# Patient Record
Sex: FEMALE | Race: WHITE | NOT HISPANIC OR LATINO | Employment: OTHER | URBAN - METROPOLITAN AREA
[De-identification: names, ages, dates, MRNs, and addresses within clinical notes are randomized per-mention and may not be internally consistent; named-entity substitution may affect disease eponyms.]

---

## 2017-01-12 ENCOUNTER — TELEPHONE (OUTPATIENT)
Dept: INTERNAL MEDICINE | Facility: CLINIC | Age: 62
End: 2017-01-12

## 2017-01-12 DIAGNOSIS — K12.1 ORAL ULCER: Primary | ICD-10-CM

## 2017-01-12 RX ORDER — ACYCLOVIR 50 MG/G
CREAM TOPICAL EVERY 6 HOURS PRN
Qty: 5 G | Refills: 6 | Status: SHIPPED | OUTPATIENT
Start: 2017-01-12 | End: 2017-05-02

## 2017-01-12 NOTE — TELEPHONE ENCOUNTER
----- Message from Tanja Matamoros sent at 1/12/2017 12:30 PM EST -----  Contact: pt - Dr De La Rosa's pt- RE: new Rx  Pt calling and would like a Rx call to pt's pharmacy for pt's cold sore on lip. Pt would like a Rx - Zovirax cream 5%. Pt informs used to get from dentist. Could you please call Rx to pt's pharmacy. Please advise. Thanks      [facility] [place]  # [phone]    Pt # [phone]

## 2017-05-01 DIAGNOSIS — Z12.31 ENCOUNTER FOR SCREENING MAMMOGRAM FOR BREAST CANCER: Primary | ICD-10-CM

## 2017-05-02 ENCOUNTER — OFFICE VISIT (OUTPATIENT)
Dept: INTERNAL MEDICINE | Facility: CLINIC | Age: 62
End: 2017-05-02

## 2017-05-02 ENCOUNTER — APPOINTMENT (OUTPATIENT)
Dept: WOMENS IMAGING | Facility: HOSPITAL | Age: 62
End: 2017-05-02

## 2017-05-02 VITALS
HEIGHT: 64 IN | WEIGHT: 137.8 LBS | SYSTOLIC BLOOD PRESSURE: 118 MMHG | BODY MASS INDEX: 23.52 KG/M2 | DIASTOLIC BLOOD PRESSURE: 76 MMHG

## 2017-05-02 DIAGNOSIS — Z01.419 ENCOUNTER FOR GYNECOLOGICAL EXAMINATION WITH PAPANICOLAOU SMEAR OF CERVIX: ICD-10-CM

## 2017-05-02 DIAGNOSIS — Z00.00 ANNUAL PHYSICAL EXAM: Primary | ICD-10-CM

## 2017-05-02 DIAGNOSIS — F41.9 ANXIETY: ICD-10-CM

## 2017-05-02 DIAGNOSIS — E78.5 HYPERLIPIDEMIA, UNSPECIFIED HYPERLIPIDEMIA TYPE: ICD-10-CM

## 2017-05-02 DIAGNOSIS — E55.9 VITAMIN D DEFICIENCY: ICD-10-CM

## 2017-05-02 DIAGNOSIS — Z12.31 ENCOUNTER FOR SCREENING MAMMOGRAM FOR BREAST CANCER: ICD-10-CM

## 2017-05-02 DIAGNOSIS — Z12.11 ENCOUNTER FOR SCREENING FECAL OCCULT BLOOD TESTING: ICD-10-CM

## 2017-05-02 DIAGNOSIS — Z78.0 MENOPAUSE: ICD-10-CM

## 2017-05-02 DIAGNOSIS — Z80.0 FAMILY HISTORY OF COLON CANCER: ICD-10-CM

## 2017-05-02 LAB
ALBUMIN SERPL-MCNC: 4.09 G/DL (ref 3.4–4.6)
ALBUMIN/GLOB SERPL: 1.4 G/DL
ALP SERPL-CCNC: 70 U/L (ref 46–116)
ALT SERPL W P-5'-P-CCNC: 43 U/L (ref 14–59)
AMORPH URATE CRY URNS QL MICRO: ABNORMAL /HPF
ANION GAP SERPL CALCULATED.3IONS-SCNC: 10 MMOL/L
AST SERPL-CCNC: 30 U/L (ref 7–37)
BACTERIA UR QL AUTO: ABNORMAL /HPF
BASOPHILS # BLD AUTO: 0.01 10*3/MM3 (ref 0–0.2)
BASOPHILS NFR BLD AUTO: 0.2 % (ref 0–2)
BILIRUB SERPL-MCNC: 0.5 MG/DL (ref 0.2–1)
BILIRUB UR QL STRIP: NEGATIVE
BUN BLD-MCNC: 18 MG/DL (ref 6–22)
BUN/CREAT SERPL: 25.7 (ref 7–25)
CALCIUM SPEC-SCNC: 9.3 MG/DL (ref 8.6–10.5)
CHLORIDE SERPL-SCNC: 103 MMOL/L (ref 95–107)
CHOLEST SERPL-MCNC: 278 MG/DL (ref 0–200)
CLARITY UR: CLEAR
CO2 SERPL-SCNC: 29 MMOL/L (ref 23–32)
COLOR UR: YELLOW
CREAT BLD-MCNC: 0.7 MG/DL (ref 0.55–1.02)
DEPRECATED RDW RBC AUTO: 46.3 FL (ref 37–54)
EOSINOPHIL # BLD AUTO: 0.1 10*3/MM3 (ref 0–0.7)
EOSINOPHIL NFR BLD AUTO: 2 % (ref 0–5)
ERYTHROCYTE [DISTWIDTH] IN BLOOD BY AUTOMATED COUNT: 13.3 % (ref 11.5–15)
GFR SERPL CREATININE-BSD FRML MDRD: 85 ML/MIN/1.73
GLOBULIN UR ELPH-MCNC: 3 GM/DL
GLUCOSE BLD-MCNC: 100 MG/DL (ref 70–100)
GLUCOSE UR STRIP-MCNC: NEGATIVE MG/DL
HCT VFR BLD AUTO: 42.2 % (ref 34.1–44.9)
HDLC SERPL-MCNC: 111 MG/DL (ref 40–81)
HEMOCCULT STL QL IA: NEGATIVE
HGB BLD-MCNC: 13.5 G/DL (ref 11.2–15.7)
HGB UR QL STRIP.AUTO: ABNORMAL
HYALINE CASTS UR QL AUTO: ABNORMAL /LPF
KETONES UR QL STRIP: NEGATIVE
LDLC SERPL CALC-MCNC: 153 MG/DL (ref 0–100)
LDLC/HDLC SERPL: 1.38 {RATIO}
LEUKOCYTE ESTERASE UR QL STRIP.AUTO: NEGATIVE
LYMPHOCYTES # BLD AUTO: 1.56 10*3/MM3 (ref 0.8–7)
LYMPHOCYTES NFR BLD AUTO: 31.8 % (ref 10–60)
MCH RBC QN AUTO: 31 PG (ref 26–34)
MCHC RBC AUTO-ENTMCNC: 32 G/DL (ref 31–37)
MCV RBC AUTO: 96.8 FL (ref 80–100)
MONOCYTES # BLD AUTO: 0.33 10*3/MM3 (ref 0–1)
MONOCYTES NFR BLD AUTO: 6.7 % (ref 0–13)
MUCOUS THREADS URNS QL MICRO: ABNORMAL /HPF
NEUTROPHILS # BLD AUTO: 2.9 10*3/MM3 (ref 1–11)
NEUTROPHILS NFR BLD AUTO: 59.3 % (ref 30–85)
NITRITE UR QL STRIP: NEGATIVE
PH UR STRIP.AUTO: 7 [PH] (ref 5–8)
PLATELET # BLD AUTO: 309 10*3/MM3 (ref 150–450)
PMV BLD AUTO: 9.9 FL (ref 6–12)
POTASSIUM BLD-SCNC: 4.3 MMOL/L (ref 3.3–5.3)
PROT SERPL-MCNC: 7.1 G/DL (ref 6.3–8.4)
PROT UR QL STRIP: NEGATIVE
RBC # BLD AUTO: 4.36 10*6/MM3 (ref 3.93–5.22)
RBC # UR: ABNORMAL /HPF
REF LAB TEST METHOD: ABNORMAL
SODIUM BLD-SCNC: 142 MMOL/L (ref 136–145)
SP GR UR STRIP: 1.02 (ref 1–1.03)
SQUAMOUS #/AREA URNS HPF: ABNORMAL /HPF
TRIGL SERPL-MCNC: 68 MG/DL (ref 0–150)
TSH SERPL-ACNC: 3.82 MIU/ML (ref 0.27–4.2)
UROBILINOGEN UR QL STRIP: ABNORMAL
VLDLC SERPL-MCNC: 13.6 MG/DL
WBC NRBC COR # BLD: 4.9 10*3/MM3 (ref 5–10)
WBC UR QL AUTO: ABNORMAL /HPF

## 2017-05-02 PROCEDURE — 81001 URINALYSIS AUTO W/SCOPE: CPT | Performed by: INTERNAL MEDICINE

## 2017-05-02 PROCEDURE — 82274 ASSAY TEST FOR BLOOD FECAL: CPT | Performed by: INTERNAL MEDICINE

## 2017-05-02 PROCEDURE — 80061 LIPID PANEL: CPT | Performed by: INTERNAL MEDICINE

## 2017-05-02 PROCEDURE — 85025 COMPLETE CBC W/AUTO DIFF WBC: CPT | Performed by: INTERNAL MEDICINE

## 2017-05-02 PROCEDURE — 99396 PREV VISIT EST AGE 40-64: CPT | Performed by: INTERNAL MEDICINE

## 2017-05-02 PROCEDURE — 80053 COMPREHEN METABOLIC PANEL: CPT | Performed by: INTERNAL MEDICINE

## 2017-05-02 PROCEDURE — 77067 SCR MAMMO BI INCL CAD: CPT | Performed by: INTERNAL MEDICINE

## 2017-05-02 PROCEDURE — 77067 SCR MAMMO BI INCL CAD: CPT | Performed by: RADIOLOGY

## 2017-05-02 RX ORDER — DESVENLAFAXINE 100 MG/1
1 TABLET, EXTENDED RELEASE ORAL DAILY
Refills: 1 | COMMUNITY
Start: 2017-04-17 | End: 2023-01-22 | Stop reason: SDUPTHER

## 2017-05-05 LAB
CHROM ANALY OVERALL INTERP-IMP: NORMAL
CONV .: NORMAL
CONV PERFORMED BY:: NORMAL
DX ICD CODE: NORMAL
HIV 1 & 2 AB SER-IMP: NORMAL
HPV I/H RISK 1 DNA CVX QL PROBE+SIG AMP: NEGATIVE
REF LAB TEST METHOD: NORMAL
STAT OF ADQ CVX/VAG CYTO-IMP: NORMAL

## 2017-06-26 ENCOUNTER — CLINICAL SUPPORT (OUTPATIENT)
Dept: INTERNAL MEDICINE | Facility: CLINIC | Age: 62
End: 2017-06-26

## 2017-06-26 DIAGNOSIS — Z78.0 MENOPAUSE: ICD-10-CM

## 2017-06-26 PROCEDURE — 77080 DXA BONE DENSITY AXIAL: CPT | Performed by: INTERNAL MEDICINE

## 2017-11-06 ENCOUNTER — OFFICE VISIT (OUTPATIENT)
Dept: INTERNAL MEDICINE | Facility: CLINIC | Age: 62
End: 2017-11-06

## 2017-11-06 VITALS
WEIGHT: 138.8 LBS | HEIGHT: 64 IN | SYSTOLIC BLOOD PRESSURE: 118 MMHG | BODY MASS INDEX: 23.7 KG/M2 | DIASTOLIC BLOOD PRESSURE: 74 MMHG

## 2017-11-06 DIAGNOSIS — Z23 NEED FOR VACCINATION: ICD-10-CM

## 2017-11-06 DIAGNOSIS — F41.9 ANXIETY: ICD-10-CM

## 2017-11-06 DIAGNOSIS — E78.5 HYPERLIPIDEMIA, UNSPECIFIED HYPERLIPIDEMIA TYPE: Primary | ICD-10-CM

## 2017-11-06 DIAGNOSIS — E55.9 VITAMIN D DEFICIENCY: ICD-10-CM

## 2017-11-06 DIAGNOSIS — Z80.0 FAMILY HISTORY OF COLON CANCER: ICD-10-CM

## 2017-11-06 PROCEDURE — 90656 IIV3 VACC NO PRSV 0.5 ML IM: CPT | Performed by: INTERNAL MEDICINE

## 2017-11-06 PROCEDURE — 99213 OFFICE O/P EST LOW 20 MIN: CPT | Performed by: INTERNAL MEDICINE

## 2017-11-06 PROCEDURE — 90471 IMMUNIZATION ADMIN: CPT | Performed by: INTERNAL MEDICINE

## 2017-11-06 NOTE — PROGRESS NOTES
"Izzy Wilde is a 62 y.o. female here for   Chief Complaint   Patient presents with   • Anxiety     6 month follow-up   • Hyperlipidemia   .    Vitals:    11/06/17 1005   BP: 118/74   BP Location: Left arm   Patient Position: Sitting   Cuff Size: Adult   Weight: 138 lb 12.8 oz (63 kg)   Height: 63.5\" (161.3 cm)       Body mass index is 24.2 kg/(m^2).    Anxiety   Presents for follow-up visit. Symptoms include nervous/anxious behavior. Patient reports no chest pain, palpitations or shortness of breath.       Hyperlipidemia   This is a chronic problem. The current episode started more than 1 year ago. The problem is controlled. Recent lipid tests were reviewed and are normal. She has no history of chronic renal disease, diabetes or liver disease. Pertinent negatives include no chest pain or shortness of breath.        The following portions of the patient's history were reviewed and updated as appropriate: allergies, current medications, past social history and problem list.    Review of Systems   Constitutional: Negative for chills, fatigue and fever.   Respiratory: Negative for cough, shortness of breath and wheezing.    Cardiovascular: Negative for chest pain, palpitations and leg swelling.   Psychiatric/Behavioral: Negative for dysphoric mood and sleep disturbance. The patient is nervous/anxious.        Objective   Physical Exam   Constitutional: She appears well-developed and well-nourished. No distress.   Cardiovascular: Normal rate, regular rhythm and normal heart sounds.    Pulmonary/Chest: No respiratory distress. She has no wheezes. She has no rales. She exhibits no tenderness.   Musculoskeletal: She exhibits no edema.   Psychiatric: She has a normal mood and affect. Her behavior is normal.   Nursing note and vitals reviewed.      Assessment/Plan   Diagnoses and all orders for this visit:    Hyperlipidemia, unspecified hyperlipidemia type  Comments:  high LDL, but very high HDL - need healthy " diet & exercise    Vitamin D deficiency  Comments:  D=35 - need daily vit D    Family history of colon cancer  Comments:  last c-scope 2/2013 - need rechk  Orders:  -     Ambulatory Referral to General Surgery    Anxiety  Comments:  controlled with meds per psychiatrist    Need for vaccination  -     Flu Vaccine Quad PF >18YR        Last CPE 5/2/2017.

## 2018-05-21 DIAGNOSIS — Z12.31 ENCOUNTER FOR SCREENING MAMMOGRAM FOR BREAST CANCER: Primary | ICD-10-CM

## 2018-05-22 ENCOUNTER — APPOINTMENT (OUTPATIENT)
Dept: WOMENS IMAGING | Facility: HOSPITAL | Age: 63
End: 2018-05-22

## 2018-05-22 ENCOUNTER — OFFICE VISIT (OUTPATIENT)
Dept: INTERNAL MEDICINE | Facility: CLINIC | Age: 63
End: 2018-05-22

## 2018-05-22 VITALS
WEIGHT: 138 LBS | HEIGHT: 64 IN | SYSTOLIC BLOOD PRESSURE: 116 MMHG | BODY MASS INDEX: 23.56 KG/M2 | HEART RATE: 58 BPM | OXYGEN SATURATION: 98 % | DIASTOLIC BLOOD PRESSURE: 78 MMHG

## 2018-05-22 DIAGNOSIS — Z80.0 FAMILY HISTORY OF COLON CANCER: ICD-10-CM

## 2018-05-22 DIAGNOSIS — E55.9 VITAMIN D DEFICIENCY: ICD-10-CM

## 2018-05-22 DIAGNOSIS — E78.5 HYPERLIPIDEMIA, UNSPECIFIED HYPERLIPIDEMIA TYPE: Primary | ICD-10-CM

## 2018-05-22 DIAGNOSIS — F41.9 ANXIETY: ICD-10-CM

## 2018-05-22 DIAGNOSIS — Z12.31 ENCOUNTER FOR SCREENING MAMMOGRAM FOR BREAST CANCER: ICD-10-CM

## 2018-05-22 DIAGNOSIS — R53.82 CHRONIC FATIGUE: ICD-10-CM

## 2018-05-22 LAB
ALBUMIN SERPL-MCNC: 4.7 G/DL (ref 3.5–5.2)
ALBUMIN/GLOB SERPL: 1.7 G/DL
ALP SERPL-CCNC: 68 U/L (ref 39–117)
ALT SERPL W P-5'-P-CCNC: 16 U/L (ref 1–33)
ANION GAP SERPL CALCULATED.3IONS-SCNC: 13.4 MMOL/L
AST SERPL-CCNC: 18 U/L (ref 1–32)
BACTERIA UR QL AUTO: ABNORMAL /HPF
BASOPHILS # BLD AUTO: 0.01 10*3/MM3 (ref 0–0.2)
BASOPHILS NFR BLD AUTO: 0.2 % (ref 0–2)
BILIRUB SERPL-MCNC: 0.3 MG/DL (ref 0.1–1.2)
BILIRUB UR QL STRIP: NEGATIVE
BUN BLD-MCNC: 20 MG/DL (ref 8–23)
BUN/CREAT SERPL: 26 (ref 7–25)
CALCIUM SPEC-SCNC: 9.9 MG/DL (ref 8.6–10.5)
CHLORIDE SERPL-SCNC: 103 MMOL/L (ref 98–107)
CHOLEST SERPL-MCNC: 254 MG/DL (ref 0–200)
CLARITY UR: CLEAR
CO2 SERPL-SCNC: 27.6 MMOL/L (ref 22–29)
COLOR UR: YELLOW
CREAT BLD-MCNC: 0.77 MG/DL (ref 0.57–1)
DEPRECATED RDW RBC AUTO: 45.5 FL (ref 37–54)
EOSINOPHIL # BLD AUTO: 0.05 10*3/MM3 (ref 0–0.7)
EOSINOPHIL NFR BLD AUTO: 1.1 % (ref 0–5)
ERYTHROCYTE [DISTWIDTH] IN BLOOD BY AUTOMATED COUNT: 13.1 % (ref 11.5–15)
GFR SERPL CREATININE-BSD FRML MDRD: 76 ML/MIN/1.73
GLOBULIN UR ELPH-MCNC: 2.7 GM/DL
GLUCOSE BLD-MCNC: 93 MG/DL (ref 65–99)
GLUCOSE UR STRIP-MCNC: NEGATIVE MG/DL
HCT VFR BLD AUTO: 42.3 % (ref 34.1–44.9)
HDLC SERPL-MCNC: 88 MG/DL (ref 40–60)
HGB BLD-MCNC: 13.5 G/DL (ref 11.2–15.7)
HGB UR QL STRIP.AUTO: ABNORMAL
HYALINE CASTS UR QL AUTO: ABNORMAL /LPF
KETONES UR QL STRIP: NEGATIVE
LDLC SERPL CALC-MCNC: 138 MG/DL (ref 0–100)
LDLC/HDLC SERPL: 1.57 {RATIO}
LEUKOCYTE ESTERASE UR QL STRIP.AUTO: NEGATIVE
LYMPHOCYTES # BLD AUTO: 1.55 10*3/MM3 (ref 0.8–7)
LYMPHOCYTES NFR BLD AUTO: 33.1 % (ref 10–60)
MCH RBC QN AUTO: 30.7 PG (ref 26–34)
MCHC RBC AUTO-ENTMCNC: 31.9 G/DL (ref 31–37)
MCV RBC AUTO: 96.1 FL (ref 80–100)
MONOCYTES # BLD AUTO: 0.35 10*3/MM3 (ref 0–1)
MONOCYTES NFR BLD AUTO: 7.5 % (ref 0–13)
MUCOUS THREADS URNS QL MICRO: ABNORMAL /HPF
NEUTROPHILS # BLD AUTO: 2.72 10*3/MM3 (ref 1–11)
NEUTROPHILS NFR BLD AUTO: 58.1 % (ref 30–85)
NITRITE UR QL STRIP: NEGATIVE
PH UR STRIP.AUTO: 6.5 [PH] (ref 5–8)
PLATELET # BLD AUTO: 275 10*3/MM3 (ref 150–450)
PMV BLD AUTO: 10.5 FL (ref 6–12)
POTASSIUM BLD-SCNC: 4.1 MMOL/L (ref 3.5–5.2)
PROT SERPL-MCNC: 7.4 G/DL (ref 6–8.5)
PROT UR QL STRIP: NEGATIVE
RBC # BLD AUTO: 4.4 10*6/MM3 (ref 3.93–5.22)
RBC # UR: ABNORMAL /HPF
REF LAB TEST METHOD: ABNORMAL
SODIUM BLD-SCNC: 144 MMOL/L (ref 136–145)
SP GR UR STRIP: 1.02 (ref 1–1.03)
SQUAMOUS #/AREA URNS HPF: ABNORMAL /HPF
TRIGL SERPL-MCNC: 141 MG/DL (ref 0–150)
TSH SERPL-ACNC: 3.16 MIU/ML (ref 0.27–4.2)
UROBILINOGEN UR QL STRIP: ABNORMAL
VLDLC SERPL-MCNC: 28.2 MG/DL (ref 5–40)
WBC NRBC COR # BLD: 4.68 10*3/MM3 (ref 5–10)
WBC UR QL AUTO: ABNORMAL /HPF

## 2018-05-22 PROCEDURE — 85025 COMPLETE CBC W/AUTO DIFF WBC: CPT | Performed by: INTERNAL MEDICINE

## 2018-05-22 PROCEDURE — 81001 URINALYSIS AUTO W/SCOPE: CPT | Performed by: INTERNAL MEDICINE

## 2018-05-22 PROCEDURE — 77067 SCR MAMMO BI INCL CAD: CPT | Performed by: INTERNAL MEDICINE

## 2018-05-22 PROCEDURE — 77067 SCR MAMMO BI INCL CAD: CPT | Performed by: RADIOLOGY

## 2018-05-22 PROCEDURE — 80061 LIPID PANEL: CPT | Performed by: INTERNAL MEDICINE

## 2018-05-22 PROCEDURE — 99213 OFFICE O/P EST LOW 20 MIN: CPT | Performed by: INTERNAL MEDICINE

## 2018-05-22 PROCEDURE — 80053 COMPREHEN METABOLIC PANEL: CPT | Performed by: INTERNAL MEDICINE

## 2018-05-22 NOTE — PROGRESS NOTES
"Izzy Wilde is a 63 y.o. female here for   Chief Complaint   Patient presents with   • Hyperlipidemia   • Anxiety   .    Vitals:    05/22/18 1047   BP: 116/78   BP Location: Left arm   Patient Position: Sitting   Cuff Size: Adult   Pulse: 58   SpO2: 98%   Weight: 62.6 kg (138 lb)   Height: 161.3 cm (63.5\")       Body mass index is 24.06 kg/m².    Hyperlipidemia   This is a chronic problem. The current episode started more than 1 year ago. The problem is controlled. Recent lipid tests were reviewed and are normal. She has no history of diabetes. Pertinent negatives include no chest pain or shortness of breath.   Anxiety   Presents for follow-up visit. Patient reports no chest pain, nervous/anxious behavior, palpitations or shortness of breath. Symptoms occur occasionally. The severity of symptoms is mild.            The following portions of the patient's history were reviewed and updated as appropriate: allergies, current medications, past social history and problem list.    Review of Systems   Constitutional: Negative for chills, fatigue and fever.   Respiratory: Negative for cough, shortness of breath and wheezing.    Cardiovascular: Negative for chest pain, palpitations and leg swelling.   Psychiatric/Behavioral: Negative for dysphoric mood and sleep disturbance. The patient is not nervous/anxious.        Objective   Physical Exam   Constitutional: She appears well-developed and well-nourished. No distress.   Cardiovascular: Normal rate, regular rhythm and normal heart sounds.    Pulmonary/Chest: No respiratory distress. She has no wheezes. She has no rales. She exhibits no tenderness.   Musculoskeletal: She exhibits no edema.   Psychiatric: She has a normal mood and affect. Her behavior is normal.   Nursing note and vitals reviewed.      Assessment/Plan   Diagnoses and all orders for this visit:    Hyperlipidemia, unspecified hyperlipidemia type  Comments:  need diet/ex  Orders:  -     Comprehensive " Metabolic Panel; Future  -     Lipid Panel; Future    Vitamin D deficiency  Comments:  continue 1,000 units daily    Family history of colon cancer  Comments:  c-scope 4/2018    Anxiety  Comments:  f/u with dr benz (psych)    Chronic fatigue  Comments:  mild  Orders:  -     CBC Auto Differential; Future  -     Urinalysis With / Microscopic If Indicated - Urine, Clean Catch; Future  -     TSH Rfx On Abnormal To Free T4; Future

## 2018-05-24 ENCOUNTER — DOCUMENTATION (OUTPATIENT)
Dept: INTERNAL MEDICINE | Facility: CLINIC | Age: 63
End: 2018-05-24

## 2018-05-24 NOTE — PROGRESS NOTES
Message left for pt to return call regarding recent screening mammogram results.  FABY Sterling(R)(M),ARRT  5-24-18  [phone]

## 2018-05-25 ENCOUNTER — DOCUMENTATION (OUTPATIENT)
Dept: INTERNAL MEDICINE | Facility: CLINIC | Age: 63
End: 2018-05-25

## 2018-05-25 DIAGNOSIS — R92.1 BREAST CALCIFICATIONS: Primary | ICD-10-CM

## 2018-05-25 NOTE — PROGRESS NOTES
Diagnostic Breast Testing scheduled at [facility] 5-31-18 at 1PM. Pt informed and Screening results discussed.     FABY Sterling(R)(M),ARRT  5-25-18 [phone]

## 2018-05-31 ENCOUNTER — APPOINTMENT (OUTPATIENT)
Dept: WOMENS IMAGING | Facility: HOSPITAL | Age: 63
End: 2018-05-31

## 2018-05-31 PROCEDURE — 77065 DX MAMMO INCL CAD UNI: CPT | Performed by: RADIOLOGY

## 2018-05-31 PROCEDURE — MDREVIEWSP: Performed by: RADIOLOGY

## 2018-12-03 ENCOUNTER — TELEPHONE (OUTPATIENT)
Dept: INTERNAL MEDICINE | Facility: CLINIC | Age: 63
End: 2018-12-03

## 2018-12-03 DIAGNOSIS — R92.1 BREAST CALCIFICATIONS: Primary | ICD-10-CM

## 2018-12-03 NOTE — TELEPHONE ENCOUNTER
----- Message from Cristina De La Rosa MD sent at 12/3/2018  4:06 PM EST -----  Regarding: RE: order needed for pt appt 12/4/18  pls order  ----- Message -----  From: Madelin Ortega RegSched Rep  Sent: 12/3/2018   1:52 PM  To: Cristina De La Rosa MD  Subject: order needed for pt appt 12/4/18                 Hi there,     We are seeing this patient at [facility] on Tuesday 12/4/18 at 1:00PM.     She is being seen for:  Left breast diagnostic mammogram  Dx: 6 month follow up for calcifications    Could you please put an order in her chart for us?  If you have any questions, please call me at: [phone].    Thank you,   Sindy Garcia

## 2018-12-04 ENCOUNTER — APPOINTMENT (OUTPATIENT)
Dept: WOMENS IMAGING | Facility: HOSPITAL | Age: 63
End: 2018-12-04

## 2018-12-04 PROCEDURE — 77065 DX MAMMO INCL CAD UNI: CPT | Performed by: RADIOLOGY

## 2019-01-09 DIAGNOSIS — R92.1 BREAST CALCIFICATIONS: ICD-10-CM

## 2019-05-23 DIAGNOSIS — Z12.31 ENCOUNTER FOR SCREENING MAMMOGRAM FOR BREAST CANCER: Primary | ICD-10-CM

## 2019-05-24 ENCOUNTER — OFFICE VISIT (OUTPATIENT)
Dept: INTERNAL MEDICINE | Facility: CLINIC | Age: 64
End: 2019-05-24

## 2019-05-24 ENCOUNTER — APPOINTMENT (OUTPATIENT)
Dept: WOMENS IMAGING | Facility: HOSPITAL | Age: 64
End: 2019-05-24

## 2019-05-24 VITALS
WEIGHT: 134.2 LBS | SYSTOLIC BLOOD PRESSURE: 134 MMHG | DIASTOLIC BLOOD PRESSURE: 72 MMHG | HEIGHT: 64 IN | TEMPERATURE: 98.1 F | HEART RATE: 60 BPM | BODY MASS INDEX: 22.91 KG/M2 | OXYGEN SATURATION: 98 % | RESPIRATION RATE: 15 BRPM

## 2019-05-24 DIAGNOSIS — E55.9 VITAMIN D DEFICIENCY: ICD-10-CM

## 2019-05-24 DIAGNOSIS — Z12.4 CERVICAL CANCER SCREENING: ICD-10-CM

## 2019-05-24 DIAGNOSIS — F41.9 ANXIETY: ICD-10-CM

## 2019-05-24 DIAGNOSIS — Z12.11 COLON CANCER SCREENING: ICD-10-CM

## 2019-05-24 DIAGNOSIS — Z80.0 FAMILY HISTORY OF COLON CANCER: ICD-10-CM

## 2019-05-24 DIAGNOSIS — Z86.010 HISTORY OF ADENOMATOUS POLYP OF COLON: ICD-10-CM

## 2019-05-24 DIAGNOSIS — E78.5 HYPERLIPIDEMIA, UNSPECIFIED HYPERLIPIDEMIA TYPE: ICD-10-CM

## 2019-05-24 DIAGNOSIS — R41.3 MEMORY LOSS: ICD-10-CM

## 2019-05-24 DIAGNOSIS — Z12.31 ENCOUNTER FOR SCREENING MAMMOGRAM FOR BREAST CANCER: ICD-10-CM

## 2019-05-24 DIAGNOSIS — Z00.00 ANNUAL PHYSICAL EXAM: Primary | ICD-10-CM

## 2019-05-24 LAB
ALBUMIN SERPL-MCNC: 4.4 G/DL (ref 3.5–5.2)
ALBUMIN/GLOB SERPL: 1.6 G/DL
ALP SERPL-CCNC: 65 U/L (ref 39–117)
ALT SERPL W P-5'-P-CCNC: 18 U/L (ref 1–33)
ANION GAP SERPL CALCULATED.3IONS-SCNC: 14.5 MMOL/L
AST SERPL-CCNC: 23 U/L (ref 1–32)
BACTERIA UR QL AUTO: ABNORMAL /HPF
BASOPHILS # BLD AUTO: 0.02 10*3/MM3 (ref 0–0.2)
BASOPHILS NFR BLD AUTO: 0.5 % (ref 0–1.5)
BILIRUB SERPL-MCNC: 0.5 MG/DL (ref 0.2–1.2)
BILIRUB UR QL CFM: NEGATIVE
BILIRUB UR QL STRIP: ABNORMAL
BUN BLD-MCNC: 16 MG/DL (ref 8–23)
BUN/CREAT SERPL: 23.2 (ref 7–25)
CALCIUM SPEC-SCNC: 10 MG/DL (ref 8.6–10.5)
CHLORIDE SERPL-SCNC: 100 MMOL/L (ref 98–107)
CHOLEST SERPL-MCNC: 249 MG/DL (ref 0–200)
CLARITY UR: CLEAR
CO2 SERPL-SCNC: 25.5 MMOL/L (ref 22–29)
COLOR UR: ABNORMAL
CREAT BLD-MCNC: 0.69 MG/DL (ref 0.57–1)
DEPRECATED RDW RBC AUTO: 45 FL (ref 37–54)
EOSINOPHIL # BLD AUTO: 0.07 10*3/MM3 (ref 0–0.4)
EOSINOPHIL NFR BLD AUTO: 1.7 % (ref 0.3–6.2)
ERYTHROCYTE [DISTWIDTH] IN BLOOD BY AUTOMATED COUNT: 13 % (ref 12.3–15.4)
FOLATE SERPL-MCNC: 10 NG/ML (ref 4.78–24.2)
GFR SERPL CREATININE-BSD FRML MDRD: 86 ML/MIN/1.73
GLOBULIN UR ELPH-MCNC: 2.8 GM/DL
GLUCOSE BLD-MCNC: 96 MG/DL (ref 65–99)
GLUCOSE UR STRIP-MCNC: NEGATIVE MG/DL
HCT VFR BLD AUTO: 42 % (ref 34–46.6)
HDLC SERPL-MCNC: 103 MG/DL (ref 40–60)
HEMOCCULT STL QL IA: NEGATIVE
HGB BLD-MCNC: 13.5 G/DL (ref 12–15.9)
HGB UR QL STRIP.AUTO: ABNORMAL
HYALINE CASTS UR QL AUTO: ABNORMAL /LPF
KETONES UR QL STRIP: ABNORMAL
LDLC SERPL CALC-MCNC: 133 MG/DL (ref 0–100)
LDLC/HDLC SERPL: 1.29 {RATIO}
LEUKOCYTE ESTERASE UR QL STRIP.AUTO: NEGATIVE
LYMPHOCYTES # BLD AUTO: 1.26 10*3/MM3 (ref 0.7–3.1)
LYMPHOCYTES NFR BLD AUTO: 31.2 % (ref 19.6–45.3)
MCH RBC QN AUTO: 31 PG (ref 26.6–33)
MCHC RBC AUTO-ENTMCNC: 32.1 G/DL (ref 31.5–35.7)
MCV RBC AUTO: 96.3 FL (ref 79–97)
MONOCYTES # BLD AUTO: 0.29 10*3/MM3 (ref 0.1–0.9)
MONOCYTES NFR BLD AUTO: 7.2 % (ref 5–12)
MUCOUS THREADS URNS QL MICRO: ABNORMAL /HPF
NEUTROPHILS # BLD AUTO: 2.4 10*3/MM3 (ref 1.7–7)
NEUTROPHILS NFR BLD AUTO: 59.4 % (ref 42.7–76)
NITRITE UR QL STRIP: NEGATIVE
PH UR STRIP.AUTO: 6.5 [PH] (ref 5–8)
PLATELET # BLD AUTO: 316 10*3/MM3 (ref 140–450)
PMV BLD AUTO: 9.9 FL (ref 6–12)
POTASSIUM BLD-SCNC: 3.9 MMOL/L (ref 3.5–5.2)
PROT SERPL-MCNC: 7.2 G/DL (ref 6–8.5)
PROT UR QL STRIP: ABNORMAL
RBC # BLD AUTO: 4.36 10*6/MM3 (ref 3.77–5.28)
RBC # UR: ABNORMAL /HPF
REF LAB TEST METHOD: ABNORMAL
SODIUM BLD-SCNC: 140 MMOL/L (ref 136–145)
SP GR UR STRIP: 1.02 (ref 1–1.03)
SQUAMOUS #/AREA URNS HPF: ABNORMAL /HPF
TRIGL SERPL-MCNC: 65 MG/DL (ref 0–150)
TSH SERPL DL<=0.05 MIU/L-ACNC: 2.86 MIU/ML (ref 0.27–4.2)
UROBILINOGEN UR QL STRIP: ABNORMAL
VIT B12 BLD-MCNC: 1165 PG/ML (ref 211–946)
VLDLC SERPL-MCNC: 13 MG/DL (ref 5–40)
WBC NRBC COR # BLD: 4.04 10*3/MM3 (ref 3.4–10.8)
WBC UR QL AUTO: ABNORMAL /HPF

## 2019-05-24 PROCEDURE — 82746 ASSAY OF FOLIC ACID SERUM: CPT | Performed by: INTERNAL MEDICINE

## 2019-05-24 PROCEDURE — 80061 LIPID PANEL: CPT | Performed by: INTERNAL MEDICINE

## 2019-05-24 PROCEDURE — 84443 ASSAY THYROID STIM HORMONE: CPT | Performed by: INTERNAL MEDICINE

## 2019-05-24 PROCEDURE — 99396 PREV VISIT EST AGE 40-64: CPT | Performed by: INTERNAL MEDICINE

## 2019-05-24 PROCEDURE — 85025 COMPLETE CBC W/AUTO DIFF WBC: CPT | Performed by: INTERNAL MEDICINE

## 2019-05-24 PROCEDURE — 82274 ASSAY TEST FOR BLOOD FECAL: CPT | Performed by: INTERNAL MEDICINE

## 2019-05-24 PROCEDURE — 77067 SCR MAMMO BI INCL CAD: CPT | Performed by: RADIOLOGY

## 2019-05-24 PROCEDURE — 77067 SCR MAMMO BI INCL CAD: CPT | Performed by: INTERNAL MEDICINE

## 2019-05-24 PROCEDURE — 82607 VITAMIN B-12: CPT | Performed by: INTERNAL MEDICINE

## 2019-05-24 PROCEDURE — 80053 COMPREHEN METABOLIC PANEL: CPT | Performed by: INTERNAL MEDICINE

## 2019-05-24 PROCEDURE — 81001 URINALYSIS AUTO W/SCOPE: CPT | Performed by: INTERNAL MEDICINE

## 2019-05-24 NOTE — PATIENT INSTRUCTIONS
Health Maintenance, Female  Adopting a healthy lifestyle and getting preventive care can go a long way to promote health and wellness. Talk with your health care provider about what schedule of regular examinations is right for you. This is a good chance for you to check in with your provider about disease prevention and staying healthy.  In between checkups, there are plenty of things you can do on your own. Experts have done a lot of research about which lifestyle changes and preventive measures are most likely to keep you healthy. Ask your health care provider for more information.  Weight and diet  Eat a healthy diet  · Be sure to include plenty of vegetables, fruits, low-fat dairy products, and lean protein.  · Do not eat a lot of foods high in solid fats, added sugars, or salt.  · Get regular exercise. This is one of the most important things you can do for your health.  ? Most adults should exercise for at least 150 minutes each week. The exercise should increase your heart rate and make you sweat (moderate-intensity exercise).  ? Most adults should also do strengthening exercises at least twice a week. This is in addition to the moderate-intensity exercise.    Maintain a healthy weight  · Body mass index (BMI) is a measurement that can be used to identify possible weight problems. It estimates body fat based on height and weight. Your health care provider can help determine your BMI and help you achieve or maintain a healthy weight.  · For females 20 years of age and older:  ? A BMI below 18.5 is considered underweight.  ? A BMI of 18.5 to 24.9 is normal.  ? A BMI of 25 to 29.9 is considered overweight.  ? A BMI of 30 and above is considered obese.    Watch levels of cholesterol and blood lipids  · You should start having your blood tested for lipids and cholesterol at 20 years of age, then have this test every 5 years.  · You may need to have your cholesterol levels checked more often if:  ? Your lipid or  cholesterol levels are high.  ? You are older than 50 years of age.  ? You are at high risk for heart disease.    Cancer screening  Lung Cancer  · Lung cancer screening is recommended for adults 55-80 years old who are at high risk for lung cancer because of a history of smoking.  · A yearly low-dose CT scan of the lungs is recommended for people who:  ? Currently smoke.  ? Have quit within the past 15 years.  ? Have at least a 30-pack-year history of smoking. A pack year is smoking an average of one pack of cigarettes a day for 1 year.  · Yearly screening should continue until it has been 15 years since you quit.  · Yearly screening should stop if you develop a health problem that would prevent you from having lung cancer treatment.    Breast Cancer  · Practice breast self-awareness. This means understanding how your breasts normally appear and feel.  · It also means doing regular breast self-exams. Let your health care provider know about any changes, no matter how small.  · If you are in your 20s or 30s, you should have a clinical breast exam (CBE) by a health care provider every 1-3 years as part of a regular health exam.  · If you are 40 or older, have a CBE every year. Also consider having a breast X-ray (mammogram) every year.  · If you have a family history of breast cancer, talk to your health care provider about genetic screening.  · If you are at high risk for breast cancer, talk to your health care provider about having an MRI and a mammogram every year.  · Breast cancer gene (BRCA) assessment is recommended for women who have family members with BRCA-related cancers. BRCA-related cancers include:  ? Breast.  ? Ovarian.  ? Tubal.  ? Peritoneal cancers.  · Results of the assessment will determine the need for genetic counseling and BRCA1 and BRCA2 testing.    Cervical Cancer  Your health care provider may recommend that you be screened regularly for cancer of the pelvic organs (ovaries, uterus, and  vagina). This screening involves a pelvic examination, including checking for microscopic changes to the surface of your cervix (Pap test). You may be encouraged to have this screening done every 3 years, beginning at age 21.  · For women ages 30-65, health care providers may recommend pelvic exams and Pap testing every 3 years, or they may recommend the Pap and pelvic exam, combined with testing for human papilloma virus (HPV), every 5 years. Some types of HPV increase your risk of cervical cancer. Testing for HPV may also be done on women of any age with unclear Pap test results.  · Other health care providers may not recommend any screening for nonpregnant women who are considered low risk for pelvic cancer and who do not have symptoms. Ask your health care provider if a screening pelvic exam is right for you.  · If you have had past treatment for cervical cancer or a condition that could lead to cancer, you need Pap tests and screening for cancer for at least 20 years after your treatment. If Pap tests have been discontinued, your risk factors (such as having a new sexual partner) need to be reassessed to determine if screening should resume. Some women have medical problems that increase the chance of getting cervical cancer. In these cases, your health care provider may recommend more frequent screening and Pap tests.    Colorectal Cancer  · This type of cancer can be detected and often prevented.  · Routine colorectal cancer screening usually begins at 50 years of age and continues through 75 years of age.  · Your health care provider may recommend screening at an earlier age if you have risk factors for colon cancer.  · Your health care provider may also recommend using home test kits to check for hidden blood in the stool.  · A small camera at the end of a tube can be used to examine your colon directly (sigmoidoscopy or colonoscopy). This is done to check for the earliest forms of colorectal  cancer.  · Routine screening usually begins at age 50.  · Direct examination of the colon should be repeated every 5-10 years through 75 years of age. However, you may need to be screened more often if early forms of precancerous polyps or small growths are found.    Skin Cancer  · Check your skin from head to toe regularly.  · Tell your health care provider about any new moles or changes in moles, especially if there is a change in a mole's shape or color.  · Also tell your health care provider if you have a mole that is larger than the size of a pencil eraser.  · Always use sunscreen. Apply sunscreen liberally and repeatedly throughout the day.  · Protect yourself by wearing long sleeves, pants, a wide-brimmed hat, and sunglasses whenever you are outside.    Heart disease, diabetes, and high blood pressure  · High blood pressure causes heart disease and increases the risk of stroke. High blood pressure is more likely to develop in:  ? People who have blood pressure in the high end of the normal range (130-139/85-89 mm Hg).  ? People who are overweight or obese.  ? People who are .  · If you are 18-39 years of age, have your blood pressure checked every 3-5 years. If you are 40 years of age or older, have your blood pressure checked every year. You should have your blood pressure measured twice--once when you are at a hospital or clinic, and once when you are not at a hospital or clinic. Record the average of the two measurements. To check your blood pressure when you are not at a hospital or clinic, you can use:  ? An automated blood pressure machine at a pharmacy.  ? A home blood pressure monitor.  · If you are between 55 years and 79 years old, ask your health care provider if you should take aspirin to prevent strokes.  · Have regular diabetes screenings. This involves taking a blood sample to check your fasting blood sugar level.  ? If you are at a normal weight and have a low risk for  diabetes, have this test once every three years after 45 years of age.  ? If you are overweight and have a high risk for diabetes, consider being tested at a younger age or more often.  Preventing infection  Hepatitis B  · If you have a higher risk for hepatitis B, you should be screened for this virus. You are considered at high risk for hepatitis B if:  ? You were born in a country where hepatitis B is common. Ask your health care provider which countries are considered high risk.  ? Your parents were born in a high-risk country, and you have not been immunized against hepatitis B (hepatitis B vaccine).  ? You have HIV or AIDS.  ? You use needles to inject street drugs.  ? You live with someone who has hepatitis B.  ? You have had sex with someone who has hepatitis B.  ? You get hemodialysis treatment.  ? You take certain medicines for conditions, including cancer, organ transplantation, and autoimmune conditions.    Hepatitis C  · Blood testing is recommended for:  ? Everyone born from 1945 through 1965.  ? Anyone with known risk factors for hepatitis C.    Sexually transmitted infections (STIs)  · You should be screened for sexually transmitted infections (STIs) including gonorrhea and chlamydia if:  ? You are sexually active and are younger than 24 years of age.  ? You are older than 24 years of age and your health care provider tells you that you are at risk for this type of infection.  ? Your sexual activity has changed since you were last screened and you are at an increased risk for chlamydia or gonorrhea. Ask your health care provider if you are at risk.  · If you do not have HIV, but are at risk, it may be recommended that you take a prescription medicine daily to prevent HIV infection. This is called pre-exposure prophylaxis (PrEP). You are considered at risk if:  ? You are sexually active and do not regularly use condoms or know the HIV status of your partner(s).  ? You take drugs by injection.  ? You  are sexually active with a partner who has HIV.    Talk with your health care provider about whether you are at high risk of being infected with HIV. If you choose to begin PrEP, you should first be tested for HIV. You should then be tested every 3 months for as long as you are taking PrEP.  Pregnancy  · If you are premenopausal and you may become pregnant, ask your health care provider about preconception counseling.  · If you may become pregnant, take 400 to 800 micrograms (mcg) of folic acid every day.  · If you want to prevent pregnancy, talk to your health care provider about birth control (contraception).  Osteoporosis and menopause  · Osteoporosis is a disease in which the bones lose minerals and strength with aging. This can result in serious bone fractures. Your risk for osteoporosis can be identified using a bone density scan.  · If you are 65 years of age or older, or if you are at risk for osteoporosis and fractures, ask your health care provider if you should be screened.  · Ask your health care provider whether you should take a calcium or vitamin D supplement to lower your risk for osteoporosis.  · Menopause may have certain physical symptoms and risks.  · Hormone replacement therapy may reduce some of these symptoms and risks.  Talk to your health care provider about whether hormone replacement therapy is right for you.  Follow these instructions at home:  · Schedule regular health, dental, and eye exams.  · Stay current with your immunizations.  · Do not use any tobacco products including cigarettes, chewing tobacco, or electronic cigarettes.  · If you are pregnant, do not drink alcohol.  · If you are breastfeeding, limit how much and how often you drink alcohol.  · Limit alcohol intake to no more than 1 drink per day for nonpregnant women. One drink equals 12 ounces of beer, 5 ounces of wine, or 1½ ounces of hard liquor.  · Do not use street drugs.  · Do not share needles.  · Ask your health care  provider for help if you need support or information about quitting drugs.  · Tell your health care provider if you often feel depressed.  · Tell your health care provider if you have ever been abused or do not feel safe at home.  This information is not intended to replace advice given to you by your health care provider. Make sure you discuss any questions you have with your health care provider.  Document Released: 07/02/2012 Document Revised: 05/25/2017 Document Reviewed: 09/20/2016  [employer] Patient Education © 2019 [employer]

## 2019-05-24 NOTE — PROGRESS NOTES
"Izzy Wilde is a 64 y.o. female here for   Chief Complaint   Patient presents with   • Annual Exam   • Anxiety   • Hyperlipidemia   .    Vitals:    05/24/19 0902   BP: 134/72   BP Location: Left arm   Patient Position: Sitting   Cuff Size: Adult   Pulse: 60   Resp: 15   Temp: 98.1 °F (36.7 °C)   TempSrc: Temporal   SpO2: 98%   Weight: 60.9 kg (134 lb 3.2 oz)   Height: 162.6 cm (64\")       Body mass index is 23.04 kg/m².    History of Present Illness     The following portions of the patient's history were reviewed and updated as appropriate: allergies, current medications, past social history and problem list.    Review of Systems   Constitutional: Negative for appetite change, chills, fatigue, fever and unexpected weight change.   HENT: Negative for congestion, ear pain, mouth sores, sinus pain, sore throat, tinnitus, trouble swallowing and voice change.    Eyes: Negative for pain and visual disturbance.   Respiratory: Negative for cough, choking, shortness of breath and wheezing.    Cardiovascular: Negative for chest pain, palpitations and leg swelling.   Gastrointestinal: Negative for abdominal pain, blood in stool, constipation, diarrhea, nausea and vomiting.   Endocrine: Negative for cold intolerance, heat intolerance, polydipsia and polyuria.   Genitourinary: Negative for difficulty urinating, dysuria, enuresis, flank pain, frequency, hematuria, urgency and vaginal bleeding.   Musculoskeletal: Negative for arthralgias, back pain, gait problem, joint swelling, myalgias, neck pain and neck stiffness.   Skin: Negative for color change and rash.   Allergic/Immunologic: Positive for environmental allergies. Negative for food allergies and immunocompromised state.   Neurological: Negative for dizziness, tremors, seizures, syncope, speech difficulty, weakness, numbness and headaches.   Hematological: Negative for adenopathy. Bruises/bleeds easily.   Psychiatric/Behavioral: Negative for agitation, " confusion, decreased concentration, dysphoric mood, sleep disturbance and suicidal ideas. The patient is nervous/anxious (rare).      Decreased memory off & on (forgetting names,etc).    Objective   Physical Exam   Constitutional: She appears well-developed and well-nourished.   HENT:   Right Ear: Hearing, tympanic membrane, external ear and ear canal normal.   Left Ear: Hearing, tympanic membrane, external ear and ear canal normal.   Nose: Right sinus exhibits no maxillary sinus tenderness and no frontal sinus tenderness. Left sinus exhibits no maxillary sinus tenderness and no frontal sinus tenderness.   Eyes: Conjunctivae, EOM and lids are normal. Pupils are equal, round, and reactive to light.   Neck: Trachea normal. Neck supple. No JVD present. Carotid bruit is not present. No tracheal deviation present. No thyroid mass and no thyromegaly present.   Cardiovascular: Normal rate, regular rhythm, S1 normal and S2 normal. Exam reveals no gallop and no friction rub.   No murmur heard.  Pulses:       Carotid pulses are 2+ on the right side, and 2+ on the left side.       Radial pulses are 2+ on the right side, and 2+ on the left side.        Dorsalis pedis pulses are 2+ on the right side, and 2+ on the left side.        Posterior tibial pulses are 2+ on the right side, and 2+ on the left side.   Pulmonary/Chest: Effort normal and breath sounds normal. Chest wall is not dull to percussion. Right breast exhibits no inverted nipple, no mass, no nipple discharge, no skin change and no tenderness. Left breast exhibits no inverted nipple, no mass, no nipple discharge, no skin change and no tenderness.   Abdominal: Soft. Normal aorta and bowel sounds are normal. She exhibits no abdominal bruit. There is no hepatosplenomegaly. There is no tenderness. There is no rebound and no guarding. No hernia.   Genitourinary: Rectum normal, vagina normal and uterus normal. Rectal exam shows no mass and guaiac negative stool. No labial  fusion. There is no rash, tenderness, lesion or injury on the right labia. There is no rash, tenderness, lesion or injury on the left labia. Cervix exhibits no discharge. Right adnexum displays no mass, no tenderness and no fullness. Left adnexum displays no mass, no tenderness and no fullness.   Musculoskeletal: Normal range of motion. She exhibits no edema.   Lymphadenopathy:     She has no cervical adenopathy.     She has no axillary adenopathy.        Right: No supraclavicular adenopathy present.        Left: No supraclavicular adenopathy present.   Neurological: She is alert. She has normal strength. No cranial nerve deficit or sensory deficit. She displays a negative Romberg sign.   Reflex Scores:       Patellar reflexes are 2+ on the right side and 2+ on the left side.  Skin: Skin is warm and dry.   Nursing note and vitals reviewed.      Assessment/Plan   Diagnoses and all orders for this visit:    Annual physical exam  -     CBC Auto Differential; Future  -     Comprehensive Metabolic Panel; Future  -     Lipid Panel; Future  -     Urinalysis With Microscopic If Indicated (No Culture) - Urine, Clean Catch; Future    Hyperlipidemia, unspecified hyperlipidemia type  Comments:  need diet/ex    Vitamin D deficiency  Comments:  need daily vit d    Family history of colon cancer  Comments:  recall c-scope 4yr    History of adenomatous polyp of colon  Comments:  recall 4yr    Anxiety  Comments:  controlled - f/u with dr benz (only taking xanax 3x yearly)    Cervical cancer screening  -     Pap IG, HPV-hr; Future    Memory loss  Comments:  refer to neuro   Orders:  -     TSH Rfx On Abnormal To Free T4; Future  -     Vitamin B12 & Folate; Future  -     Ambulatory Referral to Neurology    Colon cancer screening  -     POC FECAL OCCULT BLOOD BY IMMUNOASSAY

## 2019-12-09 ENCOUNTER — OFFICE VISIT (OUTPATIENT)
Dept: INTERNAL MEDICINE | Facility: CLINIC | Age: 64
End: 2019-12-09

## 2019-12-09 VITALS
BODY MASS INDEX: 21.34 KG/M2 | OXYGEN SATURATION: 97 % | HEIGHT: 64 IN | DIASTOLIC BLOOD PRESSURE: 80 MMHG | TEMPERATURE: 98.9 F | RESPIRATION RATE: 16 BRPM | HEART RATE: 77 BPM | SYSTOLIC BLOOD PRESSURE: 118 MMHG | WEIGHT: 125 LBS

## 2019-12-09 DIAGNOSIS — R53.82 CHRONIC FATIGUE: ICD-10-CM

## 2019-12-09 DIAGNOSIS — E78.5 HYPERLIPIDEMIA, UNSPECIFIED HYPERLIPIDEMIA TYPE: ICD-10-CM

## 2019-12-09 DIAGNOSIS — F41.9 ANXIETY: ICD-10-CM

## 2019-12-09 DIAGNOSIS — Z82.0 FAMILY HISTORY OF ALZHEIMER'S DISEASE: ICD-10-CM

## 2019-12-09 DIAGNOSIS — R41.3 MEMORY DISTURBANCE: Primary | ICD-10-CM

## 2019-12-09 DIAGNOSIS — R79.89 INCREASED AMMONIA LEVEL: ICD-10-CM

## 2019-12-09 PROCEDURE — 99214 OFFICE O/P EST MOD 30 MIN: CPT | Performed by: INTERNAL MEDICINE

## 2019-12-09 RX ORDER — CYANOCOBALAMIN (VITAMIN B-12) 500 MCG
500 TABLET ORAL DAILY
COMMUNITY

## 2019-12-09 RX ORDER — DESVENLAFAXINE 100 MG/1
1 TABLET, EXTENDED RELEASE ORAL DAILY
Refills: 1 | COMMUNITY
Start: 2019-10-07 | End: 2019-12-09 | Stop reason: DRUGHIGH

## 2019-12-09 NOTE — PROGRESS NOTES
"Izzy Wilde is a 64 y.o. female here for   Chief Complaint   Patient presents with   • Memory Loss     Follow-up from hospital visit at [facility] on 12/5/19   .    Vitals:    12/09/19 1112   BP: 118/80   BP Location: Left arm   Patient Position: Sitting   Cuff Size: Adult   Pulse: 77   Resp: 16   Temp: 98.9 °F (37.2 °C)   TempSrc: Temporal   SpO2: 97%   Weight: 56.7 kg (125 lb)   Height: 162.6 cm (64\")       Body mass index is 21.46 kg/m².    Memory Loss   This is a new problem. The current episode started 1 to 4 weeks ago. The problem has been resolved. Associated symptoms include weakness (she felt weakness of right arm & leg on 12/3 - lasted 1 min). Pertinent negatives include no chest pain, chills, coughing, fatigue, fever, headaches or numbness.        The following portions of the patient's history were reviewed and updated as appropriate: allergies, current medications, past social history and problem list.    Review of Systems   Constitutional: Negative for chills, fatigue and fever.   Respiratory: Negative for cough, shortness of breath and wheezing.    Cardiovascular: Negative for chest pain, palpitations and leg swelling.   Neurological: Positive for weakness (she felt weakness of right arm & leg on 12/3 - lasted 1 min). Negative for dizziness, seizures, syncope, facial asymmetry, speech difficulty, numbness and headaches.   Psychiatric/Behavioral: Positive for confusion (memory loss for recent events after 12/3/19). Negative for dysphoric mood and sleep disturbance. The patient is nervous/anxious.        Objective   Physical Exam   Constitutional: She appears well-developed and well-nourished. No distress.   Cardiovascular: Normal rate, regular rhythm and normal heart sounds.   Pulmonary/Chest: No respiratory distress. She has no wheezes. She has no rales. She exhibits no tenderness.   Musculoskeletal: She exhibits no edema.   Psychiatric: She has a normal " mood and affect. Her behavior is normal.   Nursing note and vitals reviewed.      Assessment/Plan   Diagnoses and all orders for this visit:    Memory disturbance  Comments:  for less than 24 hrs after 1 min episode of right sided weakness & feeling disconnected -normal ER visit incl brain CT - refer to neurology  Orders:  -     Ambulatory Referral to Neurology  -     TSH Rfx On Abnormal To Free T4; Future    Family history of Alzheimer's disease  Comments:  she is worried about sister's dx of alzheimers     Increased ammonia level  Comments:  need rechk in 2-3 wks (no alcohol since ER)  Orders:  -     Ammonia; Future    Hyperlipidemia, unspecified hyperlipidemia type  Comments:  continue diet/ex  Orders:  -     Comprehensive Metabolic Panel; Future  -     Lipid Panel; Future    Chronic fatigue  Comments:  need repeat TSH    Anxiety  Comments:  keep f/u with psychiatrist    Other orders  -     Discontinue: Desvenlafaxine  MG tablet sustained-release 24 hour; Take 1 tablet by mouth Daily.  -     Cyanocobalamin (VITAMIN B 12) 500 MCG tablet; Take 500 mcg by mouth Daily.

## 2019-12-20 ENCOUNTER — TRANSCRIBE ORDERS (OUTPATIENT)
Dept: ADMINISTRATIVE | Facility: HOSPITAL | Age: 64
End: 2019-12-20

## 2019-12-20 DIAGNOSIS — Z13.6 ENCOUNTER FOR SCREENING FOR VASCULAR DISEASE: ICD-10-CM

## 2019-12-20 DIAGNOSIS — R41.3 MEMORY LOSS: Primary | ICD-10-CM

## 2019-12-20 DIAGNOSIS — N50.1: Primary | ICD-10-CM

## 2019-12-23 ENCOUNTER — HOSPITAL ENCOUNTER (OUTPATIENT)
Dept: CARDIOLOGY | Facility: HOSPITAL | Age: 64
Discharge: HOME | End: 2019-12-23
Admitting: SURGERY

## 2019-12-23 VITALS
DIASTOLIC BLOOD PRESSURE: 66 MMHG | BODY MASS INDEX: 20.33 KG/M2 | WEIGHT: 122 LBS | HEART RATE: 64 BPM | HEIGHT: 65 IN | SYSTOLIC BLOOD PRESSURE: 120 MMHG

## 2019-12-23 DIAGNOSIS — Z13.6 ENCOUNTER FOR SCREENING FOR VASCULAR DISEASE: ICD-10-CM

## 2019-12-23 LAB
BH CV VAS BP LEFT ARM: NORMAL MMHG
BH CV VAS BP RIGHT ARM: NORMAL MMHG
BH CV XLRA MEAS LEFT ICA/CCA RATIO: 0.9
BH CV XLRA MEAS LEFT MID CCA PSV: NORMAL CM/SEC
BH CV XLRA MEAS LEFT MID ICA PSV: NORMAL CM/SEC
BH CV XLRA MEAS LEFT PROX ECA PSV: NORMAL CM/SEC
BH CV XLRA MEAS RIGHT ICA/CCA RATIO: 0.92
BH CV XLRA MEAS RIGHT MID CCA PSV: NORMAL CM/SEC
BH CV XLRA MEAS RIGHT MID ICA PSV: NORMAL CM/SEC
BH CV XLRA MEAS RIGHT PROX ECA PSV: NORMAL CM/SEC

## 2019-12-23 PROCEDURE — 93799 UNLISTED CV SVC/PROCEDURE: CPT

## 2019-12-27 ENCOUNTER — LAB (OUTPATIENT)
Dept: INTERNAL MEDICINE | Facility: CLINIC | Age: 64
End: 2019-12-27

## 2019-12-27 DIAGNOSIS — R79.89 INCREASED AMMONIA LEVEL: ICD-10-CM

## 2019-12-27 DIAGNOSIS — R41.3 MEMORY DISTURBANCE: ICD-10-CM

## 2019-12-27 DIAGNOSIS — E78.5 HYPERLIPIDEMIA, UNSPECIFIED HYPERLIPIDEMIA TYPE: ICD-10-CM

## 2019-12-27 LAB
ALBUMIN SERPL-MCNC: 4.6 G/DL (ref 3.5–5.2)
ALBUMIN/GLOB SERPL: 1.8 G/DL
ALP SERPL-CCNC: 63 U/L (ref 39–117)
ALT SERPL W P-5'-P-CCNC: 22 U/L (ref 1–33)
AMMONIA BLD-SCNC: 21 UMOL/L (ref 11–51)
ANION GAP SERPL CALCULATED.3IONS-SCNC: 10.7 MMOL/L (ref 5–15)
AST SERPL-CCNC: 19 U/L (ref 1–32)
BILIRUB SERPL-MCNC: 0.5 MG/DL (ref 0.2–1.2)
BUN BLD-MCNC: 16 MG/DL (ref 8–23)
BUN/CREAT SERPL: 19.5 (ref 7–25)
CALCIUM SPEC-SCNC: 9.7 MG/DL (ref 8.6–10.5)
CHLORIDE SERPL-SCNC: 102 MMOL/L (ref 98–107)
CHOLEST SERPL-MCNC: 245 MG/DL (ref 0–200)
CO2 SERPL-SCNC: 29.3 MMOL/L (ref 22–29)
CREAT BLD-MCNC: 0.82 MG/DL (ref 0.57–1)
GFR SERPL CREATININE-BSD FRML MDRD: 70 ML/MIN/1.73
GLOBULIN UR ELPH-MCNC: 2.6 GM/DL
GLUCOSE BLD-MCNC: 94 MG/DL (ref 65–99)
HDLC SERPL-MCNC: 102 MG/DL (ref 40–60)
LDLC SERPL CALC-MCNC: 125 MG/DL (ref 0–100)
LDLC/HDLC SERPL: 1.23 {RATIO}
POTASSIUM BLD-SCNC: 4 MMOL/L (ref 3.5–5.2)
PROT SERPL-MCNC: 7.2 G/DL (ref 6–8.5)
SODIUM BLD-SCNC: 142 MMOL/L (ref 136–145)
TRIGL SERPL-MCNC: 88 MG/DL (ref 0–150)
TSH SERPL DL<=0.05 MIU/L-ACNC: 3.49 UIU/ML (ref 0.27–4.2)
VLDLC SERPL-MCNC: 17.6 MG/DL (ref 5–40)

## 2019-12-27 PROCEDURE — 36415 COLL VENOUS BLD VENIPUNCTURE: CPT | Performed by: INTERNAL MEDICINE

## 2019-12-27 PROCEDURE — 84443 ASSAY THYROID STIM HORMONE: CPT | Performed by: INTERNAL MEDICINE

## 2019-12-27 PROCEDURE — 80053 COMPREHEN METABOLIC PANEL: CPT | Performed by: INTERNAL MEDICINE

## 2019-12-27 PROCEDURE — 80061 LIPID PANEL: CPT | Performed by: INTERNAL MEDICINE

## 2019-12-27 PROCEDURE — 82140 ASSAY OF AMMONIA: CPT | Performed by: INTERNAL MEDICINE

## 2020-01-10 ENCOUNTER — HOSPITAL ENCOUNTER (OUTPATIENT)
Dept: MRI IMAGING | Facility: HOSPITAL | Age: 65
Discharge: HOME | End: 2020-01-10
Admitting: PSYCHIATRY & NEUROLOGY

## 2020-01-10 DIAGNOSIS — R41.3 MEMORY LOSS: ICD-10-CM

## 2020-01-10 PROCEDURE — 0 GADOBENATE DIMEGLUMINE 529 MG/ML SOLUTION: Performed by: PSYCHIATRY & NEUROLOGY

## 2020-01-10 PROCEDURE — 70553 MRI BRAIN STEM W/O & W/DYE: CPT

## 2020-01-10 PROCEDURE — A9577 INJ MULTIHANCE: HCPCS | Performed by: PSYCHIATRY & NEUROLOGY

## 2020-01-10 RX ADMIN — GADOBENATE DIMEGLUMINE 11 ML: 529 INJECTION, SOLUTION INTRAVENOUS at 13:39

## 2020-05-17 ENCOUNTER — PATIENT MESSAGE (OUTPATIENT)
Dept: INTERNAL MEDICINE | Facility: CLINIC | Age: 65
End: 2020-05-17

## 2020-05-18 NOTE — TELEPHONE ENCOUNTER
From: Ayesha Wilde  To: Cristina De La Rosa MD  Sent: 5/17/2020 5:33 PM EDT  Subject: Non-Urgent Medical Question    Wondering when my last tetanus shot was received. Working in yard, cut the skin of my arm, put antibiotic cream and bandaid. Just the skin was cut I believe, working in yard, pulling weeds and planting seeds, nothing too bad, just dirt, mostly. No record of shot in my chart, so just making sure I am covered. Was using a metal garden tool. No idea when it happened.

## 2020-06-08 ENCOUNTER — TELEPHONE (OUTPATIENT)
Dept: INTERNAL MEDICINE | Facility: CLINIC | Age: 65
End: 2020-06-08

## 2020-06-08 NOTE — TELEPHONE ENCOUNTER
PATIENT CALLED STATING THAT SHE SLICED PART OF HER MIDDLE FINGER ON HER RIGHT HAND OFF TRYING TO CUT A SLICE OF BREAD. THE PATIENT STATED THAT IT WAS BLEEDING AND THROBBING, AND SHE HAD IT WRAPPED IN A PAPER TOWEL AND APPLIED PRESSURE TO THE WOUND.     I DIRECTED THE PATIENT /ED, PER OUR [facility] PROCESS, AND SHE STATED THAT HER  WOULD TAKE HER TO [facility] ED.    IF THERE ARE ANY QUESTIONS OR CONCERNS PLEASE CALL THE PATIENT BACK -[phone].

## 2020-06-16 ENCOUNTER — APPOINTMENT (OUTPATIENT)
Dept: WOMENS IMAGING | Facility: HOSPITAL | Age: 65
End: 2020-06-16

## 2020-11-02 ENCOUNTER — OFFICE VISIT (OUTPATIENT)
Dept: INTERNAL MEDICINE | Facility: CLINIC | Age: 65
End: 2020-11-02

## 2020-11-02 VITALS
DIASTOLIC BLOOD PRESSURE: 74 MMHG | SYSTOLIC BLOOD PRESSURE: 100 MMHG | WEIGHT: 125 LBS | HEIGHT: 65 IN | TEMPERATURE: 97.8 F | OXYGEN SATURATION: 99 % | BODY MASS INDEX: 20.83 KG/M2 | HEART RATE: 57 BPM

## 2020-11-02 DIAGNOSIS — S61.312S: ICD-10-CM

## 2020-11-02 DIAGNOSIS — Z78.0 MENOPAUSE: ICD-10-CM

## 2020-11-02 DIAGNOSIS — Z12.31 ENCOUNTER FOR SCREENING MAMMOGRAM FOR BREAST CANCER: ICD-10-CM

## 2020-11-02 DIAGNOSIS — Z00.00 WELCOME TO MEDICARE PREVENTIVE VISIT: Primary | ICD-10-CM

## 2020-11-02 DIAGNOSIS — Z80.0 FAMILY HISTORY OF COLON CANCER: ICD-10-CM

## 2020-11-02 DIAGNOSIS — Z86.010 HISTORY OF ADENOMATOUS POLYP OF COLON: ICD-10-CM

## 2020-11-02 DIAGNOSIS — E78.5 HYPERLIPIDEMIA, UNSPECIFIED HYPERLIPIDEMIA TYPE: ICD-10-CM

## 2020-11-02 DIAGNOSIS — E55.9 VITAMIN D DEFICIENCY: ICD-10-CM

## 2020-11-02 DIAGNOSIS — R41.3 MEMORY LOSS: ICD-10-CM

## 2020-11-02 DIAGNOSIS — R53.82 CHRONIC FATIGUE: ICD-10-CM

## 2020-11-02 DIAGNOSIS — Z23 NEED FOR VACCINATION AGAINST STREPTOCOCCUS PNEUMONIAE USING PNEUMOCOCCAL CONJUGATE VACCINE 13: ICD-10-CM

## 2020-11-02 PROBLEM — S61.218A LACERATION OF MIDDLE FINGER: Status: ACTIVE | Noted: 2020-06-08

## 2020-11-02 PROCEDURE — G0402 INITIAL PREVENTIVE EXAM: HCPCS | Performed by: INTERNAL MEDICINE

## 2020-11-02 PROCEDURE — 90732 PPSV23 VACC 2 YRS+ SUBQ/IM: CPT | Performed by: INTERNAL MEDICINE

## 2020-11-02 PROCEDURE — 99213 OFFICE O/P EST LOW 20 MIN: CPT | Performed by: INTERNAL MEDICINE

## 2020-11-02 PROCEDURE — G0009 ADMIN PNEUMOCOCCAL VACCINE: HCPCS | Performed by: INTERNAL MEDICINE

## 2020-11-02 NOTE — PROGRESS NOTES
"Izzy Wilde is a 65 y.o. female here for   Chief Complaint   Patient presents with   • Welcome To Medicare   • Fatigue   • Hyperlipidemia   • Memory Loss   .    Vitals:    11/02/20 0933   BP: 100/74   BP Location: Left arm   Patient Position: Sitting   Cuff Size: Adult   Pulse: 57   Temp: 97.8 °F (36.6 °C)   SpO2: 99%   Weight: 56.7 kg (125 lb)   Height: 163.8 cm (64.5\")       Body mass index is 21.12 kg/m².    Hyperlipidemia  This is a chronic problem. The current episode started more than 1 year ago. The problem is controlled. Recent lipid tests were reviewed and are normal. Pertinent negatives include no chest pain or shortness of breath.   Memory Loss  This is a chronic problem. The current episode started more than 1 year ago. The problem occurs constantly. The problem has been gradually improving. Associated symptoms include fatigue (off/on). Pertinent negatives include no abdominal pain, chest pain, chills, coughing, fever, headaches, numbness or weakness.   Fatigue  This is a chronic problem. The current episode started more than 1 year ago. The problem occurs constantly. The problem has been unchanged. Associated symptoms include fatigue (off/on). Pertinent negatives include no abdominal pain, chest pain, chills, coughing, fever, headaches, numbness or weakness.        The following portions of the patient's history were reviewed and updated as appropriate: allergies, current medications, past social history and problem list.    Review of Systems   Constitutional: Positive for fatigue (off/on). Negative for chills and fever.   Eyes: Negative for pain.   Respiratory: Negative for cough, shortness of breath and wheezing.    Cardiovascular: Negative for chest pain, palpitations and leg swelling.   Gastrointestinal: Negative for abdominal pain.   Neurological: Negative for dizziness, tremors, seizures, syncope, facial asymmetry, speech difficulty, weakness, light-headedness, numbness and " headaches.   Psychiatric/Behavioral: Negative for dysphoric mood and sleep disturbance. The patient is not nervous/anxious.      She injured her right third fingernail-it came off but is growing back with roughened areas.    Objective   Physical Exam  Vitals signs and nursing note reviewed.   Constitutional:       General: She is not in acute distress.     Appearance: Normal appearance. She is well-developed and normal weight.   HENT:      Head: Normocephalic and atraumatic.   Cardiovascular:      Rate and Rhythm: Normal rate and regular rhythm.      Heart sounds: Normal heart sounds.   Pulmonary:      Effort: No respiratory distress.      Breath sounds: No wheezing or rales.   Chest:      Chest wall: No tenderness.   Abdominal:      Tenderness: There is no abdominal tenderness.   Musculoskeletal: Normal range of motion.   Skin:     General: Skin is warm and dry.      Findings: No rash.   Neurological:      General: No focal deficit present.      Mental Status: She is alert.      Cranial Nerves: No cranial nerve deficit.      Motor: No weakness.      Gait: Gait normal.   Psychiatric:         Mood and Affect: Mood normal.         Behavior: Behavior normal.         Thought Content: Thought content normal.         Judgment: Judgment normal.     Right third fingernail has normal proximal nail returning but some irregularity at the most distal part.    Assessment/Plan   Diagnoses and all orders for this visit:    Welcome to Medicare preventive visit    Hyperlipidemia, unspecified hyperlipidemia type  Comments:  Need diet and daily exercise.  Orders:  -     Comprehensive Metabolic Panel  -     Lipid Panel    Vitamin D deficiency  Comments:  Need daily vitamin D.  Orders:  -     Vitamin D 25 Hydroxy    Family history of colon cancer  Comments:  Need routine colonoscopy.    Chronic fatigue  Comments:  Mild-call if worse.  Orders:  -     CBC & Differential  -     TSH Rfx On Abnormal To Free T4  -     Urinalysis With  Microscopic If Indicated (No Culture) - Urine, Clean Catch  -     Cancel: Vitamin B12 & Folate    History of adenomatous polyp of colon  Comments:  Routine colonoscopy.    Memory loss  Comments:  [id number] with neurology.  Orders:  -     Vitamin D 25 Hydroxy    Laceration of right middle finger without foreign body with damage to nail, sequela  Comments:  Injury to right third fingernail-fingernail is coming back-use antifungal cream to prevent fungual infection.    Encounter for screening mammogram for breast cancer  -     Mammo Screening Bilateral With CAD; Future    Menopause  -     DEXA Bone Density Axial; Future    Need for vaccination against Streptococcus pneumoniae using pneumococcal conjugate vaccine 13  -     pneumococcal conj. 13-valent (PREVNAR-13) vaccine 0.5 mL    Other orders  -     ciclopirox (LOPROX) 0.77 % cream; KARYN SPARINGLY TO THE TOENAILS BID  -     Calcium Polycarbophil (FIBER-CAPS PO); Take  by mouth Daily. Take 2 capsules daily  -     Ascorbic Acid (JAZMIN-C PO); Take 1,000 mg by mouth Daily.

## 2020-11-02 NOTE — PATIENT INSTRUCTIONS
Medicare Wellness  Personal Prevention Plan of Service     Date of Office Visit:  2020  Encounter Provider:  Cristina De La Rosa MD  Place of Service:  [facility]  Patient Name: Ayesha Wilde  :  1955    As part of the Medicare Wellness portion of your visit today, we are providing you with this personalized preventive plan of services (PPPS). This plan is based upon recommendations of the [place] Preventive Services Task Force (USPSTF) and the Advisory Committee on Immunization Practices (ACIP).    This lists the preventive care services that should be considered, and provides dates of when you are due. Items listed as completed are up-to-date and do not require any further intervention.    Health Maintenance   Topic Date Due   • ANNUAL WELLNESS VISIT  2016   • DXA SCAN  2019   • Pneumococcal Vaccine 65+ (1 of 1 - PPSV23) 2020   • LIPID PANEL  2020   • MAMMOGRAM  2021   • PAP SMEAR  2022   • COLONOSCOPY  2023   • TDAP/TD VACCINES (2 - Td) 2026   • HEPATITIS C SCREENING  Completed   • INFLUENZA VACCINE  Completed   • ZOSTER VACCINE  Completed       No orders of the defined types were placed in this encounter.      No follow-ups on file.

## 2020-11-02 NOTE — PROGRESS NOTES
The ABCs of the Annual Wellness Visit  Welcome to Medicare Visit    Chief Complaint   Patient presents with   • Welcome To Medicare       Subjective   History of Present Illness:  Ayesha Wilde is a 65 y.o. female who presents for a  Welcome to Medicare Visit.    HEALTH RISK ASSESSMENT    Recent Hospitalizations:  No hospitalization(s) within the last year.    Current Medical Providers:  Patient Care Team:  Cristina De La Rosa MD as PCP - General (Internal Medicine)  Alize Spain MD as Consulting Physician (General Surgery)  Bebeto Perry MD as Consulting Physician (Dermatology)  Jone Calle MD (Psychiatry)  Crystal Hernandez MD as Consulting Physician (Ophthalmology)  Max Lee MD as Consulting Physician (Otolaryngology)    Smoking Status:  Social History     Tobacco Use   Smoking Status Former Smoker   • Years: 4.00   • Quit date:    • Years since quittin.8   Smokeless Tobacco Never Used       Alcohol Consumption:  Social History     Substance and Sexual Activity   Alcohol Use Yes   • Types: 3 Glasses of wine, 10 Cans of beer per week    Comment: Moderate       Depression Screen:   PHQ-2/PHQ-9 Depression Screening 2020   Little interest or pleasure in doing things 0   Feeling down, depressed, or hopeless 0   Trouble falling or staying asleep, or sleeping too much 0   Feeling tired or having little energy 0   Poor appetite or overeating 0   Feeling bad about yourself - or that you are a failure or have let yourself or your family down 0   Trouble concentrating on things, such as reading the newspaper or watching television 0   Moving or speaking so slowly that other people could have noticed. Or the opposite - being so fidgety or restless that you have been moving around a lot more than usual 0   Thoughts that you would be better off dead, or of hurting yourself in some way 0   Total Score 0   If you checked off any problems, how difficult have these problems made it  for you to do your work, take care of things at home, or get along with other people? Not difficult at all       Fall Risk Screen:  AYLIN Fall Risk Assessment was completed, and patient is at LOW risk for falls.Assessment completed on:11/2/2020    Health Habits and Functional and Cognitive Screening:  Functional & Cognitive Status 11/2/2020   Do you have difficulty preparing food and eating? No   Do you have difficulty bathing yourself, getting dressed or grooming yourself? No   Do you have difficulty using the toilet? No   Do you have difficulty moving around from place to place? No   Do you have trouble with steps or getting out of a bed or a chair? No   Current Diet Well Balanced Diet   Dental Exam Up to date   Eye Exam Up to date   Exercise (times per week) 3 times per week   Current Exercises Include Treadmill;Walking   Do you need help using the phone?  No   Are you deaf or do you have serious difficulty hearing?  No   Do you need help with transportation? No   Do you need help shopping? No   Do you need help preparing meals?  No   Do you need help with housework?  No   Do you need help with laundry? No   Do you need help taking your medications? No   Do you need help managing money? No   Do you ever drive or ride in a car without wearing a seat belt? No   Have you felt unusual stress, anger or loneliness in the last month? No   Who do you live with? Spouse   If you need help, do you have trouble finding someone available to you? No   Have you been bothered in the last four weeks by sexual problems? No   Do you have difficulty concentrating, remembering or making decisions? No         Does the patient have evidence of cognitive impairment? No    Asprin use counseling:Does not need ASA (and currently is not on it)    Visual Acuity:    No exam data present    Age-appropriate Screening Schedule:  Refer to the list below for future screening recommendations based on patient's age, sex and/or medical conditions.  Orders for these recommended tests are listed in the plan section. The patient has been provided with a written plan.    Health Maintenance   Topic Date Due   • DXA SCAN  06/26/2019   • LIPID PANEL  12/27/2020   • MAMMOGRAM  05/24/2021   • PAP SMEAR  05/24/2022   • COLONOSCOPY  04/27/2023   • TDAP/TD VACCINES (2 - Td) 04/28/2026   • INFLUENZA VACCINE  Completed   • ZOSTER VACCINE  Completed          The following portions of the patient's history were reviewed and updated as appropriate: allergies, current medications, past family history, past medical history, past social history, past surgical history and problem list.    Outpatient Medications Prior to Visit   Medication Sig Dispense Refill   • ALPRAZolam (XANAX) 0.5 MG tablet Take 0.5 mg by mouth 2 (two) times a day as needed for anxiety.     • Ascorbic Acid (JAZMIN-C PO) Take 1,000 mg by mouth Daily.     • BIOTIN FORTE PO Take 1,000 mcg by mouth Daily.     • Calcium Polycarbophil (FIBER-CAPS PO) Take  by mouth Daily. Take 2 capsules daily     • ciclopirox (LOPROX) 0.77 % cream KARYN SPARINGLY TO THE TOENAILS BID     • desvenlafaxine (PRISTIQ) 100 MG 24 hr tablet Take 1 tablet by mouth Daily.  1   • lysine acetate 500 MG tablet tablet Take  by mouth daily.     • Omega-3 Fatty Acids (FISH OIL) 1000 MG capsule capsule Take  by mouth daily with breakfast.     • vitamin B-6 (PYRIDOXINE) 50 MG tablet Take 50 mg by mouth daily.     • Cyanocobalamin (VITAMIN B 12) 500 MCG tablet Take 500 mcg by mouth Daily.       No facility-administered medications prior to visit.        Patient Active Problem List   Diagnosis   • Hyperlipidemia   • Lumbago   • Anxiety   • Vitamin D deficiency   • Family history of colon cancer   • Chronic fatigue   • Osteopenia   • Screen for colon cancer   • History of adenomatous polyp of colon   • Memory loss   • Family history of Alzheimer's disease   • Laceration of middle finger       Advanced Care Planning:  ACP discussion was held with the  "patient during this visit. Patient has an advance directive in EMR which is still valid.     Review of Systems    Compared to one year ago, the patient feels her physical health is better.  Compared to one year ago, the patient feels her mental health is better.    Reviewed chart for potential of high risk medication in the elderly: yes  Reviewed chart for potential of harmful drug interactions in the elderly:yes    Objective         Vitals:    11/02/20 0933   BP: 100/74   BP Location: Left arm   Patient Position: Sitting   Cuff Size: Adult   Pulse: 57   Temp: 97.8 °F (36.6 °C)   SpO2: 99%   Weight: 56.7 kg (125 lb)   Height: 163.8 cm (64.5\")       Body mass index is 21.12 kg/m².  Discussed the patient's BMI with her. The BMI is in the acceptable range.    Physical Exam          Procedures    Assessment/Plan   Medicare Risks and Personalized Health Plan  CMS Preventative Services Quick Reference  Advance Directive Discussion  Breast Cancer/Mammogram Screening  Dementia/Memory   Hearing Problem  Immunizations Discussed/Encouraged (specific immunizations; Pneumococcal 23 and Prevnar )    The above risks/problems have been discussed with the patient.  Pertinent information has been shared with the patient in the After Visit Summary.  Follow up plans and orders are seen below in the Assessment/Plan Section.    There are no diagnoses linked to this encounter.    Follow Up:  No follow-ups on file.     An After Visit Summary and PPPS were given to the patient.      Need daily strengthening & balance exercises (shown today).   Normal carotid u/s 11 mos ago.    "

## 2020-11-03 LAB
25(OH)D3+25(OH)D2 SERPL-MCNC: 22.1 NG/ML (ref 30–100)
ALBUMIN SERPL-MCNC: 4.3 G/DL (ref 3.8–4.8)
ALBUMIN/GLOB SERPL: 2 {RATIO} (ref 1.2–2.2)
ALP SERPL-CCNC: 72 IU/L (ref 39–117)
ALT SERPL-CCNC: 22 IU/L (ref 0–32)
APPEARANCE UR: ABNORMAL
AST SERPL-CCNC: 22 IU/L (ref 0–40)
BASOPHILS # BLD AUTO: 0 X10E3/UL (ref 0–0.2)
BASOPHILS NFR BLD AUTO: 1 %
BILIRUB SERPL-MCNC: 0.4 MG/DL (ref 0–1.2)
BILIRUB UR QL STRIP: NEGATIVE
BUN SERPL-MCNC: 16 MG/DL (ref 8–27)
BUN/CREAT SERPL: 21 (ref 12–28)
CALCIUM SERPL-MCNC: 9.5 MG/DL (ref 8.7–10.3)
CHLORIDE SERPL-SCNC: 105 MMOL/L (ref 96–106)
CHOLEST SERPL-MCNC: 256 MG/DL (ref 100–199)
CO2 SERPL-SCNC: 28 MMOL/L (ref 20–29)
COLOR UR: YELLOW
CREAT SERPL-MCNC: 0.78 MG/DL (ref 0.57–1)
EOSINOPHIL # BLD AUTO: 0.1 X10E3/UL (ref 0–0.4)
EOSINOPHIL NFR BLD AUTO: 2 %
ERYTHROCYTE [DISTWIDTH] IN BLOOD BY AUTOMATED COUNT: 12.6 % (ref 11.7–15.4)
GLOBULIN SER CALC-MCNC: 2.1 G/DL (ref 1.5–4.5)
GLUCOSE SERPL-MCNC: 82 MG/DL (ref 65–99)
GLUCOSE UR QL: NEGATIVE
HCT VFR BLD AUTO: 39.7 % (ref 34–46.6)
HDLC SERPL-MCNC: 96 MG/DL
HGB BLD-MCNC: 13 G/DL (ref 11.1–15.9)
HGB UR QL STRIP: NEGATIVE
IMM GRANULOCYTES # BLD AUTO: 0 X10E3/UL (ref 0–0.1)
IMM GRANULOCYTES NFR BLD AUTO: 0 %
KETONES UR QL STRIP: NEGATIVE
LDLC SERPL CALC-MCNC: 147 MG/DL (ref 0–99)
LEUKOCYTE ESTERASE UR QL STRIP: NEGATIVE
LYMPHOCYTES # BLD AUTO: 1.3 X10E3/UL (ref 0.7–3.1)
LYMPHOCYTES NFR BLD AUTO: 35 %
MCH RBC QN AUTO: 30.6 PG (ref 26.6–33)
MCHC RBC AUTO-ENTMCNC: 32.7 G/DL (ref 31.5–35.7)
MCV RBC AUTO: 93 FL (ref 79–97)
MICRO URNS: ABNORMAL
MONOCYTES # BLD AUTO: 0.2 X10E3/UL (ref 0.1–0.9)
MONOCYTES NFR BLD AUTO: 6 %
NEUTROPHILS # BLD AUTO: 2.1 X10E3/UL (ref 1.4–7)
NEUTROPHILS NFR BLD AUTO: 56 %
NITRITE UR QL STRIP: NEGATIVE
PH UR STRIP: 7.5 [PH] (ref 5–7.5)
PLATELET # BLD AUTO: 286 X10E3/UL (ref 150–450)
POTASSIUM SERPL-SCNC: 4.5 MMOL/L (ref 3.5–5.2)
PROT SERPL-MCNC: 6.4 G/DL (ref 6–8.5)
PROT UR QL STRIP: NEGATIVE
RBC # BLD AUTO: 4.25 X10E6/UL (ref 3.77–5.28)
SODIUM SERPL-SCNC: 144 MMOL/L (ref 134–144)
SP GR UR: 1.02 (ref 1–1.03)
TRIGL SERPL-MCNC: 77 MG/DL (ref 0–149)
TSH SERPL DL<=0.005 MIU/L-ACNC: 2.4 UIU/ML (ref 0.45–4.5)
UROBILINOGEN UR STRIP-MCNC: 1 MG/DL (ref 0.2–1)
VLDLC SERPL CALC-MCNC: 13 MG/DL (ref 5–40)
WBC # BLD AUTO: 3.8 X10E3/UL (ref 3.4–10.8)

## 2021-03-19 ENCOUNTER — BULK ORDERING (OUTPATIENT)
Dept: CASE MANAGEMENT | Facility: OTHER | Age: 66
End: 2021-03-19

## 2021-03-19 DIAGNOSIS — Z23 IMMUNIZATION DUE: ICD-10-CM

## 2021-04-09 ENCOUNTER — TELEPHONE (OUTPATIENT)
Dept: INTERNAL MEDICINE | Facility: CLINIC | Age: 66
End: 2021-04-09

## 2021-04-09 NOTE — TELEPHONE ENCOUNTER
PATIENT IS INQUIRING WHEN SHE IS DUE FOR A COLONOSCOPY... CAN SOMEONE PLEASE LOOK INTO THAT AND CALL PATIENT WITH A TIMEFRAME OF HER NEXT DUE..    PATIENT CAN BE REACHED AT: [phone]

## 2021-05-03 ENCOUNTER — OFFICE VISIT (OUTPATIENT)
Dept: INTERNAL MEDICINE | Facility: CLINIC | Age: 66
End: 2021-05-03

## 2021-05-03 ENCOUNTER — CLINICAL SUPPORT (OUTPATIENT)
Dept: INTERNAL MEDICINE | Facility: CLINIC | Age: 66
End: 2021-05-03

## 2021-05-03 ENCOUNTER — APPOINTMENT (OUTPATIENT)
Dept: WOMENS IMAGING | Facility: HOSPITAL | Age: 66
End: 2021-05-03

## 2021-05-03 VITALS
TEMPERATURE: 97.1 F | OXYGEN SATURATION: 99 % | DIASTOLIC BLOOD PRESSURE: 74 MMHG | HEIGHT: 65 IN | BODY MASS INDEX: 20.89 KG/M2 | SYSTOLIC BLOOD PRESSURE: 120 MMHG | WEIGHT: 125.4 LBS | HEART RATE: 65 BPM

## 2021-05-03 DIAGNOSIS — M81.0 OSTEOPOROSIS OF LUMBAR SPINE: ICD-10-CM

## 2021-05-03 DIAGNOSIS — F41.9 ANXIETY: ICD-10-CM

## 2021-05-03 DIAGNOSIS — E55.9 VITAMIN D DEFICIENCY: ICD-10-CM

## 2021-05-03 DIAGNOSIS — Z12.31 ENCOUNTER FOR SCREENING MAMMOGRAM FOR BREAST CANCER: ICD-10-CM

## 2021-05-03 DIAGNOSIS — E78.5 HYPERLIPIDEMIA, UNSPECIFIED HYPERLIPIDEMIA TYPE: Primary | ICD-10-CM

## 2021-05-03 DIAGNOSIS — R41.3 MEMORY LOSS: ICD-10-CM

## 2021-05-03 DIAGNOSIS — M25.552 CHRONIC PAIN OF BOTH HIPS: ICD-10-CM

## 2021-05-03 DIAGNOSIS — M25.551 CHRONIC PAIN OF BOTH HIPS: ICD-10-CM

## 2021-05-03 DIAGNOSIS — R06.81 WITNESSED EPISODE OF APNEA: ICD-10-CM

## 2021-05-03 DIAGNOSIS — Z78.0 MENOPAUSE: ICD-10-CM

## 2021-05-03 DIAGNOSIS — Z86.010 HISTORY OF ADENOMATOUS POLYP OF COLON: ICD-10-CM

## 2021-05-03 DIAGNOSIS — G89.29 CHRONIC PAIN OF BOTH HIPS: ICD-10-CM

## 2021-05-03 PROBLEM — S61.218A LACERATION OF MIDDLE FINGER: Status: RESOLVED | Noted: 2020-06-08 | Resolved: 2021-05-03

## 2021-05-03 LAB
25(OH)D3+25(OH)D2 SERPL-MCNC: 58.6 NG/ML (ref 30–100)
ALBUMIN SERPL-MCNC: 4.5 G/DL (ref 3.5–5.2)
ALBUMIN/GLOB SERPL: 2.4 G/DL
ALP SERPL-CCNC: 77 U/L (ref 39–117)
ALT SERPL-CCNC: 35 U/L (ref 1–33)
AST SERPL-CCNC: 33 U/L (ref 1–32)
BASOPHILS # BLD AUTO: 0.04 10*3/MM3 (ref 0–0.2)
BASOPHILS NFR BLD AUTO: 1 % (ref 0–1.5)
BILIRUB SERPL-MCNC: 0.5 MG/DL (ref 0–1.2)
BUN SERPL-MCNC: 19 MG/DL (ref 8–23)
BUN/CREAT SERPL: 29.2 (ref 7–25)
CALCIUM SERPL-MCNC: 9.8 MG/DL (ref 8.6–10.5)
CHLORIDE SERPL-SCNC: 103 MMOL/L (ref 98–107)
CHOLEST SERPL-MCNC: 262 MG/DL (ref 0–200)
CO2 SERPL-SCNC: 30.5 MMOL/L (ref 22–29)
CREAT SERPL-MCNC: 0.65 MG/DL (ref 0.57–1)
EOSINOPHIL # BLD AUTO: 0.05 10*3/MM3 (ref 0–0.4)
EOSINOPHIL NFR BLD AUTO: 1.3 % (ref 0.3–6.2)
ERYTHROCYTE [DISTWIDTH] IN BLOOD BY AUTOMATED COUNT: 12.3 % (ref 12.3–15.4)
FOLATE SERPL-MCNC: 7.87 NG/ML (ref 4.78–24.2)
GLOBULIN SER CALC-MCNC: 1.9 GM/DL
GLUCOSE SERPL-MCNC: 87 MG/DL (ref 65–99)
HCT VFR BLD AUTO: 40.4 % (ref 34–46.6)
HDLC SERPL-MCNC: 113 MG/DL (ref 40–60)
HGB BLD-MCNC: 13.3 G/DL (ref 12–15.9)
IMM GRANULOCYTES # BLD AUTO: 0.01 10*3/MM3 (ref 0–0.05)
IMM GRANULOCYTES NFR BLD AUTO: 0.3 % (ref 0–0.5)
LDLC SERPL CALC-MCNC: 140 MG/DL (ref 0–100)
LYMPHOCYTES # BLD AUTO: 1.2 10*3/MM3 (ref 0.7–3.1)
LYMPHOCYTES NFR BLD AUTO: 31.5 % (ref 19.6–45.3)
MCH RBC QN AUTO: 30.6 PG (ref 26.6–33)
MCHC RBC AUTO-ENTMCNC: 32.9 G/DL (ref 31.5–35.7)
MCV RBC AUTO: 92.9 FL (ref 79–97)
MONOCYTES # BLD AUTO: 0.27 10*3/MM3 (ref 0.1–0.9)
MONOCYTES NFR BLD AUTO: 7.1 % (ref 5–12)
NEUTROPHILS # BLD AUTO: 2.24 10*3/MM3 (ref 1.7–7)
NEUTROPHILS NFR BLD AUTO: 58.8 % (ref 42.7–76)
NRBC BLD AUTO-RTO: 0 /100 WBC (ref 0–0.2)
PLATELET # BLD AUTO: 292 10*3/MM3 (ref 140–450)
POTASSIUM SERPL-SCNC: 4.2 MMOL/L (ref 3.5–5.2)
PROT SERPL-MCNC: 6.4 G/DL (ref 6–8.5)
RBC # BLD AUTO: 4.35 10*6/MM3 (ref 3.77–5.28)
SODIUM SERPL-SCNC: 141 MMOL/L (ref 136–145)
TRIGL SERPL-MCNC: 57 MG/DL (ref 0–150)
TSH SERPL DL<=0.005 MIU/L-ACNC: 3.47 UIU/ML (ref 0.27–4.2)
VIT B12 SERPL-MCNC: 827 PG/ML (ref 211–946)
VLDLC SERPL CALC-MCNC: 9 MG/DL (ref 5–40)
WBC # BLD AUTO: 3.81 10*3/MM3 (ref 3.4–10.8)

## 2021-05-03 PROCEDURE — 77080 DXA BONE DENSITY AXIAL: CPT | Performed by: INTERNAL MEDICINE

## 2021-05-03 PROCEDURE — 99214 OFFICE O/P EST MOD 30 MIN: CPT | Performed by: INTERNAL MEDICINE

## 2021-05-03 PROCEDURE — 77067 SCR MAMMO BI INCL CAD: CPT | Performed by: RADIOLOGY

## 2021-05-03 PROCEDURE — 77067 SCR MAMMO BI INCL CAD: CPT | Performed by: INTERNAL MEDICINE

## 2021-05-03 RX ORDER — FLUCONAZOLE 150 MG/1
TABLET ORAL
COMMUNITY
Start: 2021-02-04 | End: 2021-05-03

## 2021-05-03 NOTE — PROGRESS NOTES
"Chief Complaint  Anxiety (6 month follow up - no problem now -needs xanax 2x yearly), Hyperlipidemia, Vitamin D Deficiency, Memory Loss, and Hip Pain    Subjective          Ayesha Wilde presents to [facility] for   Anxiety  Presents for follow-up visit. Patient reports no depressed mood or excessive worry. Symptoms occur rarely. The severity of symptoms is mild.       Hyperlipidemia  This is a chronic problem. The current episode started more than 1 year ago. The problem is controlled. Recent lipid tests were reviewed and are normal. She has no history of diabetes.   Memory Loss  This is a chronic problem. The current episode started more than 1 year ago. The problem occurs intermittently. The problem has been unchanged. Associated symptoms include arthralgias (bilat hip pain getting worse).   Hip Pain   The incident occurred more than 1 week ago. There was no injury mechanism. The pain is present in the left hip and right hip. The quality of the pain is described as aching. The pain is moderate. The pain has been fluctuating since onset.       Review of Systems   Respiratory: Positive for apnea (witnessed by ).    Musculoskeletal: Positive for arthralgias (bilat hip pain getting worse).        Objective   Vital Signs:   /74 (BP Location: Left arm, Patient Position: Sitting, Cuff Size: Adult)   Pulse 65   Temp 97.1 °F (36.2 °C)   Ht 163.8 cm (64.5\")   Wt 56.9 kg (125 lb 6.4 oz)   SpO2 99%   BMI 21.19 kg/m²     Physical Exam  Vitals and nursing note reviewed.   Constitutional:       General: She is not in acute distress.     Appearance: She is well-developed.   Cardiovascular:      Rate and Rhythm: Normal rate and regular rhythm.      Heart sounds: Normal heart sounds.   Pulmonary:      Effort: No respiratory distress.      Breath sounds: No wheezing or rales.   Chest:      Chest wall: No tenderness.   Psychiatric:         Mood and Affect: Mood normal.         " Behavior: Behavior normal.         Thought Content: Thought content normal.         Judgment: Judgment normal.       Decreased ROM of bilateral hips.    Result Review :                 Assessment and Plan    Problem List Items Addressed This Visit        Unprioritized    Hyperlipidemia - Primary    Current Assessment & Plan     Lipid abnormalities are unchanged.  Nutritional counseling was provided.  Lipids will be reassessed in 6 months.         Relevant Orders    Comprehensive Metabolic Panel    Lipid Panel    Anxiety    Overview     Pristiq per psychiatrist         Vitamin D deficiency    Current Assessment & Plan     Continue vit D 4,000 units daily.         Relevant Orders    Vitamin D 25 Hydroxy    History of adenomatous polyp of colon    Overview     4/2018 - recall 5 yrs         Memory loss    Overview     She states not getting worse. She does not feel that it is a problem.         Current Assessment & Plan     Seen by neuropsych & neurologist - told to do Lumosity Rhea daily (tests ok)         Relevant Orders    CBC & Differential    TSH Rfx On Abnormal To Free T4    Vitamin B12 & Folate    Osteoporosis of lumbar spine    Current Assessment & Plan     New onset - consider evista, prolia, fosamax,etc.         Chronic pain of both hips    Current Assessment & Plan     Worse for 6 mos - refer to ortho         Relevant Orders    Ambulatory Referral to Orthopedic Surgery    Witnessed episode of apnea    Relevant Orders    Ambulatory Referral to Sleep Medicine       She will contact me with decisions on med for osteoporosis.      Follow Up   Return in about 6 months (around 11/3/2021) for Medicare Wellness.  Patient was given instructions and counseling regarding her condition or for health maintenance advice. Please see specific information pulled into the AVS if appropriate.

## 2021-05-04 NOTE — PROGRESS NOTES
High cholesterol - need low fat diet & exercise. High liver tests - recheck 1-2 mos. Thyroid is ok - recheck several mos. Normal vitamin B & D.

## 2021-05-07 ENCOUNTER — TELEPHONE (OUTPATIENT)
Dept: INTERNAL MEDICINE | Facility: CLINIC | Age: 66
End: 2021-05-07

## 2021-05-07 NOTE — TELEPHONE ENCOUNTER
PATIENT CALLED WANTING TO KNOW IF SHE HAS HAD AN UPDATED TETANUS SHOT BECAUSE SHE CUT HERSELF ON SOME OLAMIDE WIRE YESTERDAY . PLEASE ADVISE.     CALLBACK [phone]

## 2021-05-11 RX ORDER — RALOXIFENE HYDROCHLORIDE 60 MG/1
60 TABLET, FILM COATED ORAL DAILY
Qty: 90 TABLET | Refills: 3 | Status: SHIPPED | OUTPATIENT
Start: 2021-05-11 | End: 2021-05-20 | Stop reason: SDUPTHER

## 2021-05-13 ENCOUNTER — PATIENT MESSAGE (OUTPATIENT)
Dept: INTERNAL MEDICINE | Facility: CLINIC | Age: 66
End: 2021-05-13

## 2021-05-13 NOTE — TELEPHONE ENCOUNTER
From: Ayesha Wilde  To: Cristina De La Rosa MD  Sent: 5/13/2021 11:26 AM EDT  Subject: Non-Urgent Medical Question    I am considering adding baby aspirin to my daily pill regiment to keep the risk of blood clots low, due to my new medication addition. Do you believe this is a good idea or am I opening the door for other problems?

## 2021-05-20 ENCOUNTER — OFFICE VISIT (OUTPATIENT)
Dept: ORTHOPEDIC SURGERY | Facility: CLINIC | Age: 66
End: 2021-05-20

## 2021-05-20 VITALS — WEIGHT: 125 LBS | TEMPERATURE: 97.2 F | BODY MASS INDEX: 20.83 KG/M2 | HEIGHT: 65 IN

## 2021-05-20 DIAGNOSIS — M16.0 PRIMARY OSTEOARTHRITIS OF BOTH HIPS: ICD-10-CM

## 2021-05-20 DIAGNOSIS — M25.552 BILATERAL HIP PAIN: Primary | ICD-10-CM

## 2021-05-20 DIAGNOSIS — M25.551 BILATERAL HIP PAIN: Primary | ICD-10-CM

## 2021-05-20 PROCEDURE — 73521 X-RAY EXAM HIPS BI 2 VIEWS: CPT | Performed by: ORTHOPAEDIC SURGERY

## 2021-05-20 PROCEDURE — 99202 OFFICE O/P NEW SF 15 MIN: CPT | Performed by: ORTHOPAEDIC SURGERY

## 2021-05-20 RX ORDER — RALOXIFENE HYDROCHLORIDE 60 MG/1
TABLET, FILM COATED ORAL
COMMUNITY
Start: 2021-05-11 | End: 2022-05-09 | Stop reason: SDUPTHER

## 2021-05-20 RX ORDER — FLUCONAZOLE 150 MG/1
TABLET ORAL
COMMUNITY
Start: 2021-05-07 | End: 2022-08-22

## 2021-05-20 NOTE — PROGRESS NOTES
Patient: Ayesha Wilde  YOB: 1955 66 y.o. female  Medical Record Number: [id number]    Chief Complaints:   Chief Complaint   Patient presents with   • Left Hip - Follow-up   • Right Hip - Follow-up       History of Present Illness:Ayesha Wilde is a 66 y.o. female who presents with complaints of bilateral anterior hip pain she describes a moderate ache which is worse with walking or increased activity.  She is still fairly active individual can walk a mile or 2 but has noticed increased soreness more recently.  She still able to perform basic activities of daily living and does not take any medication for it.    Allergies:   Allergies   Allergen Reactions   • Lanolin Unknown - Low Severity   • Codeine        Medications:   Current Outpatient Medications   Medication Sig Dispense Refill   • Ascorbic Acid (JAZMIN-C PO) Take 1,000 mg by mouth Daily.     • BIOTIN FORTE PO Take 1,000 mcg by mouth Daily.     • Calcium Polycarbophil (FIBER-CAPS PO) Take  by mouth Daily. Take 2 capsules daily     • Cyanocobalamin (VITAMIN B 12) 500 MCG tablet Take 500 mcg by mouth Daily.     • desvenlafaxine (PRISTIQ) 100 MG 24 hr tablet Take 1 tablet by mouth Daily.  1   • fluconazole (DIFLUCAN) 150 MG tablet      • lysine acetate 500 MG tablet tablet Take  by mouth daily.     • Omega-3 Fatty Acids (FISH OIL) 1000 MG capsule capsule Take  by mouth daily with breakfast.     • raloxifene (EVISTA) 60 MG tablet TAKE 1 TABLET BY MOUTH DAILY     • vitamin B-6 (PYRIDOXINE) 50 MG tablet Take 50 mg by mouth daily.       No current facility-administered medications for this visit.         The following portions of the patient's history were reviewed and updated as appropriate: allergies, current medications, past family history, past medical history, past social history, past surgical history and problem list.    Review of Systems:   A 14 point review of systems was performed. All systems negative except pertinent positives/negative  "listed in HPI above    Physical Exam:   Vitals:    05/20/21 0945   Temp: 97.2 °F (36.2 °C)   TempSrc: Temporal   Weight: 56.7 kg (125 lb)   Height: 165.1 cm (65\")       General: A and O x 3, ASA, NAD    SCLERA:    Normal    DENTITION:   Normal  Hip:  bilateral    LEG ALIGNMENT:     Neutral   ,    equal leg lengths    GAIT:     Nonantalgic    SKIN:     No abnormality    RANGE OF MOTION:      Full without joint irritability    STRENGTH:     5 / 5    hip flexion and abduction    DISTAL PULSES:    Paplable    DISTAL SENSATION :   Intact    LYMPHATICS:     No   lymphadenopathy    OTHER:          - Negative Stinchfeld test      - Negative log roll      - No Tenderness to palpation trochanteric bursa      - Neg FADIR      - Neg CALE      - No SI tenderness        Radiology:  Xrays 2views both hip (AP bilateral hips, and lateral of the hip) ordered and reviewed for evaluation of hip pain  demonstrating  mild joint space narrowing -  Mostly inferior osteophytes. There are no previous films for comparision.    Assessment/Plan: Bilateral hip osteoarthritis mild to moderate in nature not bad enough to consider surgical intervention I recommended intermittent occasional anti-inflammatories or Tylenol as needed.  If symptoms worsened she can always come back.  I did recommend that she continue to work on low impact exercise on a regular basis.      Corbin Queen MD  5/20/2021  "

## 2021-06-10 ENCOUNTER — OFFICE VISIT (OUTPATIENT)
Dept: SLEEP MEDICINE | Facility: HOSPITAL | Age: 66
End: 2021-06-10

## 2021-06-10 VITALS
DIASTOLIC BLOOD PRESSURE: 74 MMHG | SYSTOLIC BLOOD PRESSURE: 122 MMHG | OXYGEN SATURATION: 99 % | HEIGHT: 65 IN | WEIGHT: 125 LBS | BODY MASS INDEX: 20.83 KG/M2 | HEART RATE: 65 BPM

## 2021-06-10 DIAGNOSIS — G47.10 HYPERSOMNIA: ICD-10-CM

## 2021-06-10 DIAGNOSIS — R06.83 SNORING: ICD-10-CM

## 2021-06-10 DIAGNOSIS — R06.81 WITNESSED EPISODE OF APNEA: Primary | ICD-10-CM

## 2021-06-10 DIAGNOSIS — R53.82 CHRONIC FATIGUE: ICD-10-CM

## 2021-06-10 PROBLEM — G47.30 OBSERVED SLEEP APNEA: Status: ACTIVE | Noted: 2021-05-03

## 2021-06-10 PROCEDURE — G0463 HOSPITAL OUTPT CLINIC VISIT: HCPCS

## 2021-06-10 PROCEDURE — 99204 OFFICE O/P NEW MOD 45 MIN: CPT | Performed by: INTERNAL MEDICINE

## 2021-06-10 NOTE — PROGRESS NOTES
[facility]  [address]  [address] [place] [address]  Phone   Fax       Ayesha Wilde  1955  66 y.o.  female      PCP:Cristina De La Rosa MD    Type of service: Initial New Patient Office Visit  Date of service: 6/10/2021    Chief Complaint   Patient presents with   • Witnessed Apnea   • Snoring   • Fatigue       History of present illness;  Ayesha Wilde is a new patient for me and was seen today for sleep related problems of snoring, nonrestorative sleep and witnessed apneas. The symptoms are present for 6 months to a year and they are persistent in nature.  The snoring is present in all positions and it is loud.  Has no history of prior sleep evaluation and sleep studies. Patient gives no prior surgery namely tonsillectomy, nasal surgery and UPPP.  Her  recently got tested as well as found to have sleep apnea but he thinks that she also has sleep apnea because he has watched her quit breathing and has significant snoring in all positions.    Patient gives the following sleep history.  Sleep schedule:  Bedtime: 10:30 PM  Wake time: 8:30 AM  Normally takes about 15-30 minutes to fall asleep  Average hours of sleep 8-10  Number of naps per day none  Symptoms  In addition to snoring, nonrestorative sleep and witnessed apneas patient gives the following associated symptoms.  Have you ever awakened gasping for breath, coughing, choking: Yes   Change in weight,  No   Morning headaches  No   Awaken with a sore throat or dry mouth  No   Leg jerking at night:  No   Crawly feeling/urge sensation to move in the legs: No   Teeth grinding:No   Have you ever awakened at night with a sour taste or burning sensation in your chest:  No   Do you have muscle weakness with laughing or anger or sleep paralysis:  No   Have you ever felt paralyzed while going to sleep or waking up:  No   Sleepwalking, nightmares, No   Nocturia (urination at night): 0 times per night  Memory  "Problem:No     Past medical history: (Relevant to sleep medicine)  1. Anxiety    Medications are reviewed by me and documented in the encounter  Allergies reviewed and documented in encounter    Social history:  Do you drive a commercial vehicle:  No   Shift work:  No   Tobacco use:  No   Alcohol use:  0 per week  Caffeinated drinks: 2    FAMILY HISTORY (Your mother, father, brothers and sisters)  1. Colon cancer    REVIEW OF SYSTEMS.  Full review of systems available on the intake form which is scanned in the media tab.  The relevant positive are noted below  1. Hatfield Sleepiness Scale :Total score: 6   2. Snoring  3. Nonrestorative sleep      Physical exam:  Vitals:    06/10/21 1440   BP: 122/74   Pulse: 65   SpO2: 99%   Weight: 56.7 kg (125 lb)   Height: 165.1 cm (65\")    Body mass index is 20.8 kg/m². Neck Circumference: 12.5 inches  HEENT: Head is atraumatic, normocephalic  Eyes: pupils are round equal and reacting to light and accommodation, conjunctiva normal  Nose: no nasal septal defects or deviation and the nasal passages are clear, no nasal polyps,  Throat: tonsils are not enlarged, tongue normal, oral airway Mallampati class 2, posterior pharynx is very narrow  NECK:Neck Circumference: 12.5 inches, trachea is in the midline, thyroid not enlarged  RESPIRATORY SYSTEM: Breath sounds are equal on both sides, there are no wheezes   CARDIOVASULAR SYSTEM: Heart sounds are regular rhythm and ash rate, no edema  EXTREMITES: No cyanosis, clubbing  NEUROLOGICAL SYSTEM: Oriented x 3, no gross motor defects, gait normal    Labs reviewed.  TSH Results:  TSH    TSH 11/2/20 5/3/21   TSH 2.400 3.470               Assessment and plan:  · Witnessed apnea (R06.81) patient's symptoms and examination is consistent with sleep apnea (G47.30)  I have talked to the patient about the signs and symptoms of sleep apnea. In addition, I have also discussed pathophysiology of sleep apnea.  I also discussed the complications of " untreated sleep apnea including effects on hypertension, diabetes mellitus and nonrestorative sleep with hypersomnia which can increase risk for motor vehicle accidents.  Untreated sleep apnea is also a risk factor for development of atrial fibrillation, pulmonary hypertension and stroke.  Discussed in detail of various testing methods including home-based and lab based sleep studies.  Based on history and physical examination the most appropriate study is home sleep test.  The order for the sleep study is placed in [facility].  The test will be scheduled after approval from insurance. Treatment and management will be discussed after the test is completed.  Patient was given opportunity to ask questions and all the questions were answered.   · Snoring (R06.83) snoring is the sound created by turbulent airflow vibrating upper airway soft tissue.  I have also discussed factors affecting snoring including sleep deprivation, sleeping on the back and alcohol ingestion. To minimize snoring, patient is advised to have adequate sleep, sleep on the side and avoid alcohol and sedative medications before bedtime  · Hypersomnia, (G 47.10) Daytime excessive sleepiness was assessed with Maynard Sleepiness Scale of Total score: 6.  There are many causes for daytime excessive sleepiness including sleep depression, shiftwork syndrome, depression and other medical disorders including heart, kidney and liver failure.  The most serious cause of hypersomnia is due to neurological conditions like narcolepsy/cataplexy.  But the most common cause of hypersomnia is due to sleep apnea with frequent awakenings during sleep time.  I have discussed safety of driving and to remain vigilant while driving.    I have also discussed with the patient the following  • Sleep hygiene: Maintaining a regular bedtime and wake time, not to watch television or work in bed, limit caffeine-containing beverages before bed time and avoid naps during the  day  • Adequate amount of sleep.  Generally most people needs about 7 to 8 hours of sleep.  • Return for 31 to 90 days after PAP setup with down load..  Patient's questions were answered.          Jaylene Bower MD  Sleep Medicine  Medical Director, [facility]  6/10/2021

## 2021-07-14 ENCOUNTER — HOSPITAL ENCOUNTER (OUTPATIENT)
Dept: SLEEP MEDICINE | Facility: HOSPITAL | Age: 66
Discharge: HOME | End: 2021-07-14
Admitting: INTERNAL MEDICINE

## 2021-07-14 DIAGNOSIS — G47.10 HYPERSOMNIA: ICD-10-CM

## 2021-07-14 DIAGNOSIS — R06.81 WITNESSED EPISODE OF APNEA: ICD-10-CM

## 2021-07-14 DIAGNOSIS — R06.83 SNORING: ICD-10-CM

## 2021-07-14 DIAGNOSIS — R53.82 CHRONIC FATIGUE: ICD-10-CM

## 2021-07-14 PROCEDURE — 95806 SLEEP STUDY UNATT&RESP EFFT: CPT

## 2021-07-14 PROCEDURE — 95806 SLEEP STUDY UNATT&RESP EFFT: CPT | Performed by: INTERNAL MEDICINE

## 2021-07-30 ENCOUNTER — TELEPHONE (OUTPATIENT)
Dept: SLEEP MEDICINE | Facility: HOSPITAL | Age: 66
End: 2021-07-30

## 2021-08-31 ENCOUNTER — PATIENT MESSAGE (OUTPATIENT)
Dept: INTERNAL MEDICINE | Facility: CLINIC | Age: 66
End: 2021-08-31

## 2021-08-31 DIAGNOSIS — Z80.0 FAMILY HISTORY OF COLON CANCER: Primary | ICD-10-CM

## 2021-10-12 ENCOUNTER — TRANSCRIBE ORDERS (OUTPATIENT)
Dept: ADMINISTRATIVE | Facility: HOSPITAL | Age: 66
End: 2021-10-12

## 2021-10-12 DIAGNOSIS — R41.3 MEMORY LOSS: Primary | ICD-10-CM

## 2021-11-16 ENCOUNTER — HOSPITAL ENCOUNTER (OUTPATIENT)
Dept: MRI IMAGING | Facility: HOSPITAL | Age: 66
Discharge: HOME | End: 2021-11-16
Admitting: PSYCHIATRY & NEUROLOGY

## 2021-11-16 DIAGNOSIS — R41.3 MEMORY LOSS: ICD-10-CM

## 2021-11-16 PROCEDURE — 82565 ASSAY OF CREATININE: CPT

## 2021-11-16 PROCEDURE — 70553 MRI BRAIN STEM W/O & W/DYE: CPT

## 2021-11-16 PROCEDURE — A9577 INJ MULTIHANCE: HCPCS | Performed by: PSYCHIATRY & NEUROLOGY

## 2021-11-16 PROCEDURE — 0 GADOBENATE DIMEGLUMINE 529 MG/ML SOLUTION: Performed by: PSYCHIATRY & NEUROLOGY

## 2021-11-16 RX ADMIN — GADOBENATE DIMEGLUMINE 12 ML: 529 INJECTION, SOLUTION INTRAVENOUS at 12:32

## 2021-11-26 LAB — CREAT BLDA-MCNC: 0.7 MG/DL (ref 0.6–1.3)

## 2022-02-17 ENCOUNTER — OFFICE VISIT (OUTPATIENT)
Dept: INTERNAL MEDICINE | Facility: CLINIC | Age: 67
End: 2022-02-17

## 2022-02-17 VITALS
DIASTOLIC BLOOD PRESSURE: 74 MMHG | HEART RATE: 68 BPM | BODY MASS INDEX: 21.49 KG/M2 | RESPIRATION RATE: 16 BRPM | WEIGHT: 129 LBS | HEIGHT: 65 IN | OXYGEN SATURATION: 98 % | SYSTOLIC BLOOD PRESSURE: 120 MMHG | TEMPERATURE: 97.1 F

## 2022-02-17 DIAGNOSIS — Z12.31 ENCOUNTER FOR SCREENING MAMMOGRAM FOR MALIGNANT NEOPLASM OF BREAST: ICD-10-CM

## 2022-02-17 DIAGNOSIS — M81.0 OSTEOPOROSIS OF LUMBAR SPINE: ICD-10-CM

## 2022-02-17 DIAGNOSIS — F41.9 ANXIETY: ICD-10-CM

## 2022-02-17 DIAGNOSIS — E78.2 MODERATE MIXED HYPERLIPIDEMIA NOT REQUIRING STATIN THERAPY: ICD-10-CM

## 2022-02-17 DIAGNOSIS — Z23 ENCOUNTER FOR IMMUNIZATION: ICD-10-CM

## 2022-02-17 DIAGNOSIS — E55.9 VITAMIN D DEFICIENCY: ICD-10-CM

## 2022-02-17 DIAGNOSIS — Z00.00 MEDICARE ANNUAL WELLNESS VISIT, SUBSEQUENT: Primary | ICD-10-CM

## 2022-02-17 PROBLEM — Z86.018 HISTORY OF ACOUSTIC NEUROMA: Chronic | Status: ACTIVE | Noted: 2022-02-17

## 2022-02-17 PROBLEM — Z12.11 SCREEN FOR COLON CANCER: Status: RESOLVED | Noted: 2018-04-10 | Resolved: 2022-02-17

## 2022-02-17 PROBLEM — K57.90 DIVERTICULOSIS: Status: ACTIVE | Noted: 2018-04-27

## 2022-02-17 PROBLEM — G47.10 HYPERSOMNIA: Status: RESOLVED | Noted: 2021-06-10 | Resolved: 2022-02-17

## 2022-02-17 PROBLEM — K63.5 COLON POLYP: Status: ACTIVE | Noted: 2018-04-27

## 2022-02-17 PROBLEM — K63.5 COLON POLYP: Status: RESOLVED | Noted: 2018-04-27 | Resolved: 2022-02-17

## 2022-02-17 PROBLEM — R06.83 SNORING: Status: RESOLVED | Noted: 2021-06-10 | Resolved: 2022-02-17

## 2022-02-17 PROCEDURE — 1159F MED LIST DOCD IN RCRD: CPT | Performed by: INTERNAL MEDICINE

## 2022-02-17 PROCEDURE — G0439 PPPS, SUBSEQ VISIT: HCPCS | Performed by: INTERNAL MEDICINE

## 2022-02-17 PROCEDURE — G0009 ADMIN PNEUMOCOCCAL VACCINE: HCPCS | Performed by: INTERNAL MEDICINE

## 2022-02-17 PROCEDURE — 1170F FXNL STATUS ASSESSED: CPT | Performed by: INTERNAL MEDICINE

## 2022-02-17 PROCEDURE — 90732 PPSV23 VACC 2 YRS+ SUBQ/IM: CPT | Performed by: INTERNAL MEDICINE

## 2022-02-17 RX ORDER — ASPIRIN 81 MG/1
81 TABLET, CHEWABLE ORAL DAILY
COMMUNITY

## 2022-02-17 NOTE — ASSESSMENT & PLAN NOTE
CONTROLLED  - FLP today for ongoing monitoring  - cont with good diet and lifestyle changes including increased exercise, low chol and low fat diet, and increased fiber

## 2022-02-17 NOTE — PROGRESS NOTES
The ABCs of the Annual Wellness Visit  Initial Medicare Wellness Visit    Pt here to establish care. Patient's past medical, social, surgical, and family history was reviewed with patient and documented into chart.       Chief Complaint   Patient presents with   • Establish Care   • Annual Exam       Subjective   History of Present Illness:  Ayesha Wilde is a 67 y.o. female who presents for an Initial Medicare Wellness Visit.    HEALTH RISK ASSESSMENT    Recent Hospitalizations:  No hospitalization(s) within the last year.    Current Medical Providers:  Patient Care Team:  Clara Snowden MD as PCP - General (Internal Medicine & Pediatrics)  Alize Spain MD as Consulting Physician (General Surgery)  Bebeto Perry MD as Consulting Physician (Dermatology)  oJne Calle MD (Psychiatry)  Crystal Hernandez MD as Consulting Physician (Ophthalmology)  Max Lee MD as Consulting Physician (Otolaryngology)  Chidi Daniel DO as Consulting Physician (Neurology)    Smoking Status:  Social History     Tobacco Use   Smoking Status Former Smoker   • Years: 10.00   • Quit date:    • Years since quittin.1   Smokeless Tobacco Never Used       Alcohol Consumption:  Social History     Substance and Sexual Activity   Alcohol Use Not Currently   • Alcohol/week: 0.0 standard drinks       Depression Screen:   PHQ-2/PHQ-9 Depression Screening 2022   Little interest or pleasure in doing things 0   Feeling down, depressed, or hopeless 0   Trouble falling or staying asleep, or sleeping too much -   Feeling tired or having little energy -   Poor appetite or overeating -   Feeling bad about yourself - or that you are a failure or have let yourself or your family down -   Trouble concentrating on things, such as reading the newspaper or watching television -   Moving or speaking so slowly that other people could have noticed. Or the opposite - being so fidgety or restless that you have been  moving around a lot more than usual -   Thoughts that you would be better off dead, or of hurting yourself in some way -   Total Score 0   If you checked off any problems, how difficult have these problems made it for you to do your work, take care of things at home, or get along with other people? -       Fall Risk Screen:  AYLIN Fall Risk Assessment was completed, and patient is at LOW risk for falls.Assessment completed on:2/17/2022    Health Habits and Functional and Cognitive Screening:  Functional & Cognitive Status 11/2/2020   Do you have difficulty preparing food and eating? No   Do you have difficulty bathing yourself, getting dressed or grooming yourself? No   Do you have difficulty using the toilet? No   Do you have difficulty moving around from place to place? No   Do you have trouble with steps or getting out of a bed or a chair? No   Current Diet Well Balanced Diet   Dental Exam Up to date   Eye Exam Up to date   Exercise (times per week) 3 times per week   Current Exercises Include Treadmill;Walking   Do you need help using the phone?  No   Are you deaf or do you have serious difficulty hearing?  No   Do you need help with transportation? No   Do you need help shopping? No   Do you need help preparing meals?  No   Do you need help with housework?  No   Do you need help with laundry? No   Do you need help taking your medications? No   Do you need help managing money? No   Do you ever drive or ride in a car without wearing a seat belt? No   Have you felt unusual stress, anger or loneliness in the last month? No   Who do you live with? Spouse   If you need help, do you have trouble finding someone available to you? No   Have you been bothered in the last four weeks by sexual problems? No   Do you have difficulty concentrating, remembering or making decisions? No         Does the patient have evidence of cognitive impairment? No    Asprin use counseling:Was taking ASA 81 daily 2/2 to concern for clotting  with osteopenia medication (Raloxifine). Discussed with patient that she can take ASA if desires, but not required.    Age-appropriate Screening Schedule:  Refer to the list below for future screening recommendations based on patient's age, sex and/or medical conditions. Orders for these recommended tests are listed in the plan section. The patient has been provided with a written plan.    Health Maintenance   Topic Date Due   • LIPID PANEL  05/03/2022   • PAP SMEAR  05/24/2022   • MAMMOGRAM  05/03/2023   • DXA SCAN  05/03/2023   • TDAP/TD VACCINES (2 - Td or Tdap) 04/28/2026   • INFLUENZA VACCINE  Completed   • ZOSTER VACCINE  Completed          Immunization History   Administered Date(s) Administered   • COVID-19 (PFIZER) PURPLE CAP 03/01/2021, 03/22/2021, 09/28/2021   • Flu Vaccine Quad PF >18YRS 11/06/2017, 09/17/2018   • FluLaval/Fluarix/Fluzone >6 09/12/2019   • Fluad Quad 65+ 09/01/2021   • Fluzone High Dose =>65 Years ([facility] ONLY) 09/02/2020, 09/01/2021   • Fluzone High-Dose 65+yrs 09/02/2020   • Hepatitis A 05/12/2018   • Influenza Quad Vaccine (Inpatient) 11/06/2017   • Pneumococcal Conjugate 13-Valent (PCV13) 11/02/2020   • Pneumococcal Polysaccharide (PPSV23) 02/17/2022   • Shingrix 09/17/2018   • Tdap 04/28/2016       The following portions of the patient's history were reviewed and updated as appropriate: allergies, current medications, past family history, past medical history, past social history, past surgical history and problem list.    Outpatient Medications Prior to Visit   Medication Sig Dispense Refill   • Ascorbic Acid (JAZMIN-C PO) Take 1,000 mg by mouth Daily.     • aspirin 81 MG chewable tablet Chew 81 mg Daily.     • BIOTIN FORTE PO Take 1,000 mcg by mouth Daily.     • Calcium Polycarbophil (FIBER-CAPS PO) Take  by mouth Daily. Take 2 capsules daily     • Cyanocobalamin (VITAMIN B 12) 500 MCG tablet Take 500 mcg by mouth Daily.     • desvenlafaxine (PRISTIQ) 100 MG 24 hr tablet Take 1  tablet by mouth Daily.  1   • lysine acetate 500 MG tablet tablet Take  by mouth daily.     • Omega-3 Fatty Acids (FISH OIL) 1000 MG capsule capsule Take  by mouth daily with breakfast.     • raloxifene (EVISTA) 60 MG tablet TAKE 1 TABLET BY MOUTH DAILY     • vitamin B-6 (PYRIDOXINE) 50 MG tablet Take 50 mg by mouth daily.     • fluconazole (DIFLUCAN) 150 MG tablet        No facility-administered medications prior to visit.       Patient Active Problem List   Diagnosis   • Hyperlipidemia   • Lumbago   • Anxiety   • Vitamin D deficiency   • Family history of colon cancer   • Chronic fatigue   • History of adenomatous polyp of colon   • Memory loss   • Family history of Alzheimer's disease   • Osteoporosis of lumbar spine   • Chronic pain of both hips   • Observed sleep apnea   • History of colon polyps   • Fibrocystic breast   • Diverticulosis   • History of acoustic neuroma       Review of Systems   Constitutional: Negative for activity change, appetite change, chills, fatigue, fever and unexpected weight change.   HENT: Negative for congestion, rhinorrhea, sinus pressure, sneezing and sore throat.    Eyes: Negative.    Respiratory: Negative for cough, shortness of breath and wheezing.    Cardiovascular: Negative for chest pain, palpitations and leg swelling.   Gastrointestinal: Negative for blood in stool, constipation, diarrhea, nausea and vomiting.   Endocrine: Negative for cold intolerance and heat intolerance.   Genitourinary: Negative for dysuria, flank pain, frequency and vaginal bleeding.   Musculoskeletal: Negative for back pain, myalgias and neck pain.   Skin: Negative for rash and wound.   Neurological: Negative for seizures, speech difficulty, weakness, light-headedness and headaches.   Psychiatric/Behavioral: Negative for confusion, decreased concentration, dysphoric mood and sleep disturbance. The patient is nervous/anxious.        Compared to one year ago, the patient feels her physical health is the  "same.  Compared to one year ago, the patient feels her mental health is the same.    Reviewed chart for potential of high risk medication in the elderly: yes  Reviewed chart for potential of harmful drug interactions in the elderly:yes    Objective         Vitals:    02/17/22 0842   BP: 120/74   Pulse: 68   Resp: 16   Temp: 97.1 °F (36.2 °C)   SpO2: 98%   Weight: 58.5 kg (129 lb)   Height: 165 cm (64.96\")       Body mass index is 21.49 kg/m².  Discussed the patient's BMI with her. The BMI is in the acceptable range.    Physical Exam  Constitutional:       General: She is not in acute distress.     Appearance: Normal appearance. She is not toxic-appearing.   HENT:      Head: Normocephalic and atraumatic.      Right Ear: External ear normal.      Left Ear: External ear normal.      Nose: Nose normal.      Mouth/Throat:      Mouth: Mucous membranes are moist.      Pharynx: Oropharynx is clear.   Eyes:      Extraocular Movements: Extraocular movements intact.      Conjunctiva/sclera: Conjunctivae normal.      Pupils: Pupils are equal, round, and reactive to light.   Cardiovascular:      Rate and Rhythm: Normal rate and regular rhythm.      Pulses: Normal pulses.      Heart sounds: Normal heart sounds. No murmur heard.      Pulmonary:      Effort: Pulmonary effort is normal.      Breath sounds: Normal breath sounds. No wheezing or rhonchi.   Abdominal:      General: Bowel sounds are normal.      Palpations: Abdomen is soft.   Musculoskeletal:         General: Normal range of motion.      Cervical back: Normal range of motion and neck supple.   Skin:     General: Skin is warm and dry.      Capillary Refill: Capillary refill takes less than 2 seconds.   Neurological:      General: No focal deficit present.      Mental Status: She is alert and oriented to person, place, and time.   Psychiatric:         Mood and Affect: Mood normal.         Behavior: Behavior normal.               Assessment/Plan   Medicare Risks and " Personalized Health Plan  CMS Preventative Services Quick Reference  Advance Directive Discussion  Alcohol Misuse  Breast Cancer/Mammogram Screening  Cardiovascular risk  Chronic Pain   Colon Cancer Screening  Dementia/Memory   Depression/Dysphoria  Diabetic Lab Screening   Fall Risk  Glaucoma Risk  Hearing Problem  Immunizations Discussed/Encouraged (specific immunizations; Tdap, Hepatitis A Vaccine/Series, Influenza, Pneumococcal 23, Shingrix and COVID19 )  Osteoporosis Risk  Urinary Incontinence    The above risks/problems have been discussed with the patient.  Pertinent information has been shared with the patient in the After Visit Summary.  Follow up plans and orders are seen below in the Assessment/Plan Section.    Diagnoses and all orders for this visit:    1. Medicare annual wellness visit, subsequent (Primary)    - Labs ordered today including CBC, CMP, Fasting lipid panel, TSH and Vit D. Will call with results once available and make follow up plan and med changes as appropriate.   - Cont current medications for chronic medical issues, refills sent as needed today.   - EKG not indicated  - PAP smear up to date and normal. Last done 05/2019 with neg cytology and HPV testing, repeat 5 years. However, pt is >64 yo with no history of abn, so no need to repeat any longer.   - Mammo due, ordered today  - Cscope Last done 2018 and abnormal with polyps and strong FHx, repeat 5 years, due 2023.   - DEXA up to date- Last done 2021 and abnormal with new osteoporosis in spine, started on raloxifene, will repeat next year  - Lung CA screening not indicated  - Immunizations: Flu shot UTD. COVID series and booster UTD. TDaP UTD, done 2016. Prenvar UTD, Pneumovax ordered today. Pt has had 1st Shingrix and Hep A vaccine, should contact pharmacy for 2nd doses in series for both of these immunizations.     Orders Placed This Encounter   Procedures   • Pneumococcal Polysaccharide Vaccine 23-Valent Greater Than or Equal To  3yo Subcutaneous / IM     - Depression screen reviewed with patient and negative.  - Advised behavioral modifications including dietary changes and increased exercise with goal of healthy weight and lifestyle.     Orders:  -     Comprehensive Metabolic Panel  -     Lipid Panel  -     TSH  -     Vitamin D 25 Hydroxy  -     CBC & Differential    2. Moderate mixed hyperlipidemia not requiring statin therapy  Assessment & Plan:  CONTROLLED  - FLP today for ongoing monitoring  - cont with good diet and lifestyle changes including increased exercise, low chol and low fat diet, and increased fiber      Orders:  -     Comprehensive Metabolic Panel  -     Lipid Panel    3. Anxiety    4. Vitamin D deficiency  -     Vitamin D 25 Hydroxy    5. Osteoporosis of lumbar spine  -     Vitamin D 25 Hydroxy    6. Encounter for immunization  -     Pneumococcal Polysaccharide Vaccine 23-Valent Greater Than or Equal To 3yo Subcutaneous / IM    7. Encounter for screening mammogram for malignant neoplasm of breast  -     Mammo Screening Bilateral With CAD; Future    Follow Up:  Return in about 6 months (around 8/17/2022) for follow up.     An After Visit Summary and PPPS were given to the patient.       Smita Snowden MD  [facility]  Phone: [phone]  Fax: [phone]

## 2022-02-18 ENCOUNTER — TELEPHONE (OUTPATIENT)
Dept: INTERNAL MEDICINE | Facility: CLINIC | Age: 67
End: 2022-02-18

## 2022-02-18 LAB
25(OH)D3+25(OH)D2 SERPL-MCNC: 44.6 NG/ML (ref 30–100)
ALBUMIN SERPL-MCNC: 4.2 G/DL (ref 3.8–4.8)
ALBUMIN/GLOB SERPL: 2 {RATIO} (ref 1.2–2.2)
ALP SERPL-CCNC: 51 IU/L (ref 44–121)
ALT SERPL-CCNC: 19 IU/L (ref 0–32)
AST SERPL-CCNC: 23 IU/L (ref 0–40)
BASOPHILS # BLD AUTO: 0 X10E3/UL (ref 0–0.2)
BASOPHILS NFR BLD AUTO: 1 %
BILIRUB SERPL-MCNC: 0.5 MG/DL (ref 0–1.2)
BUN SERPL-MCNC: 19 MG/DL (ref 8–27)
BUN/CREAT SERPL: 25 (ref 12–28)
CALCIUM SERPL-MCNC: 9.1 MG/DL (ref 8.7–10.3)
CHLORIDE SERPL-SCNC: 104 MMOL/L (ref 96–106)
CHOLEST SERPL-MCNC: 246 MG/DL (ref 100–199)
CO2 SERPL-SCNC: 24 MMOL/L (ref 20–29)
CREAT SERPL-MCNC: 0.76 MG/DL (ref 0.57–1)
EOSINOPHIL # BLD AUTO: 0.1 X10E3/UL (ref 0–0.4)
EOSINOPHIL NFR BLD AUTO: 2 %
ERYTHROCYTE [DISTWIDTH] IN BLOOD BY AUTOMATED COUNT: 12.3 % (ref 11.7–15.4)
GLOBULIN SER CALC-MCNC: 2.1 G/DL (ref 1.5–4.5)
GLUCOSE SERPL-MCNC: 88 MG/DL (ref 65–99)
HCT VFR BLD AUTO: 39.2 % (ref 34–46.6)
HDLC SERPL-MCNC: 91 MG/DL
HGB BLD-MCNC: 12.7 G/DL (ref 11.1–15.9)
IMM GRANULOCYTES # BLD AUTO: 0 X10E3/UL (ref 0–0.1)
IMM GRANULOCYTES NFR BLD AUTO: 0 %
LDLC SERPL CALC-MCNC: 145 MG/DL (ref 0–99)
LYMPHOCYTES # BLD AUTO: 1.4 X10E3/UL (ref 0.7–3.1)
LYMPHOCYTES NFR BLD AUTO: 35 %
MCH RBC QN AUTO: 30.2 PG (ref 26.6–33)
MCHC RBC AUTO-ENTMCNC: 32.4 G/DL (ref 31.5–35.7)
MCV RBC AUTO: 93 FL (ref 79–97)
MONOCYTES # BLD AUTO: 0.3 X10E3/UL (ref 0.1–0.9)
MONOCYTES NFR BLD AUTO: 8 %
NEUTROPHILS # BLD AUTO: 2.1 X10E3/UL (ref 1.4–7)
NEUTROPHILS NFR BLD AUTO: 54 %
PLATELET # BLD AUTO: 277 X10E3/UL (ref 150–450)
POTASSIUM SERPL-SCNC: 4.5 MMOL/L (ref 3.5–5.2)
PROT SERPL-MCNC: 6.3 G/DL (ref 6–8.5)
RBC # BLD AUTO: 4.21 X10E6/UL (ref 3.77–5.28)
SODIUM SERPL-SCNC: 140 MMOL/L (ref 134–144)
TRIGL SERPL-MCNC: 61 MG/DL (ref 0–149)
TSH SERPL DL<=0.005 MIU/L-ACNC: 3.46 UIU/ML (ref 0.45–4.5)
VLDLC SERPL CALC-MCNC: 10 MG/DL (ref 5–40)
WBC # BLD AUTO: 3.9 X10E3/UL (ref 3.4–10.8)

## 2022-02-18 NOTE — TELEPHONE ENCOUNTER
Caller: Ayesha Wilde    Relationship: Self    Best call back number: [phone]    What form or medical record are you requesting: ALL NOTES AND IMMUNIZATION RECORDS    Who is requesting this form or medical record from you: PATIENT     How would you like to receive the form or medical records (pick-up, mail, fax): FAX   If fax, what is the fax number:[phone]   If mail, what is the address:   If pick-up, provide patient with address and location details    Timeframe paperwork needed:     Additional notes: PATIENT HAS BEEN INFORMED THAT [facility] OFFICES SHARES Epic AND CAN SEE ALL NOTES. SHE IS VERY LEERY OF THIS AND WOULD LIKE ALL HER IMMUNIZATIONS AND  RECORDS SENT TO DR. GEE'S OFFICE. PLEASE CALL PATIENT AND ADVISE

## 2022-02-21 ENCOUNTER — TRANSCRIBE ORDERS (OUTPATIENT)
Dept: ADMINISTRATIVE | Facility: HOSPITAL | Age: 67
End: 2022-02-21

## 2022-02-21 DIAGNOSIS — Z12.31 SCREENING MAMMOGRAM, ENCOUNTER FOR: Primary | ICD-10-CM

## 2022-04-28 ENCOUNTER — OFFICE VISIT (OUTPATIENT)
Dept: INTERNAL MEDICINE | Facility: CLINIC | Age: 67
End: 2022-04-28

## 2022-04-28 VITALS
OXYGEN SATURATION: 96 % | TEMPERATURE: 95.3 F | DIASTOLIC BLOOD PRESSURE: 64 MMHG | HEART RATE: 73 BPM | SYSTOLIC BLOOD PRESSURE: 110 MMHG | HEIGHT: 65 IN | BODY MASS INDEX: 21.47 KG/M2 | RESPIRATION RATE: 16 BRPM

## 2022-04-28 DIAGNOSIS — R41.3 MEMORY LOSS: Primary | ICD-10-CM

## 2022-04-28 DIAGNOSIS — H00.014 HORDEOLUM EXTERNUM OF LEFT UPPER EYELID: ICD-10-CM

## 2022-04-28 PROCEDURE — 99214 OFFICE O/P EST MOD 30 MIN: CPT | Performed by: INTERNAL MEDICINE

## 2022-04-28 NOTE — PROGRESS NOTES
"Chief Complaint  Memory Loss and Eye Problem    Subjective          Ayesha Wilde presents to [facility]  December 3, 2019 had sudden worsening of memory issues. They have seen a Neurologist since then as well as a neuropsychologist for memory issues. Her  accompanies her stating that the memory issues come and go.   Sister dies October last year, memory issues worsened, Neuro thought mostly due to stress. Since then returned to baseline.   Overall on days that are more stressful her memory tends to be worse.   Acoustic neuroma removed in 1994.   Has been diagnosed with possible dementia versus Alzheimer's dementia.   Working on getting her involved in [place] neuroscience team.   Sister passed from Alzheimer's diagnosed when she was 55.     Neurologic Problem  The patient's primary symptoms include memory loss. This is a chronic problem. The current episode started more than 1 year ago. The neurological problem developed suddenly. The problem is unchanged. There was no focality noted. Pertinent negatives include no abdominal pain, auditory change, aura, back pain, bladder incontinence, bowel incontinence, chest pain, confusion, diaphoresis, dizziness, fatigue, fever, headaches, light-headedness, nausea, neck pain, palpitations, shortness of breath, vertigo or vomiting. Past treatments include nothing. The treatment provided no relief.     Pt is accompanied by her spouse who provides additional history.     Objective   Vital Signs:   /64   Pulse 73   Temp 95.3 °F (35.2 °C)   Resp 16   Ht 165.1 cm (65\")   SpO2 96%   BMI 21.47 kg/m²     Physical Exam  Vitals and nursing note reviewed.   Constitutional:       Appearance: Normal appearance.   HENT:      Head: Normocephalic and atraumatic.      Nose: Nose normal.      Mouth/Throat:      Mouth: Mucous membranes are moist.      Pharynx: Oropharynx is clear.   Eyes:      Extraocular Movements: Extraocular " movements intact.      Conjunctiva/sclera: Conjunctivae normal.      Pupils: Pupils are equal, round, and reactive to light.      Comments: There is a right upper eyelid stye with minimal surrounding erythema. There is no discharge from the affected area.    Cardiovascular:      Rate and Rhythm: Normal rate and regular rhythm.      Pulses: Normal pulses.      Heart sounds: Normal heart sounds.   Pulmonary:      Effort: Pulmonary effort is normal.      Breath sounds: Normal breath sounds. No wheezing, rhonchi or rales.   Musculoskeletal:         General: Normal range of motion.      Cervical back: Normal range of motion and neck supple.      Right lower leg: No edema.      Left lower leg: No edema.   Skin:     General: Skin is warm and dry.      Capillary Refill: Capillary refill takes less than 2 seconds.      Findings: No rash.   Neurological:      General: No focal deficit present.      Mental Status: She is alert and oriented to person, place, and time. Mental status is at baseline.   Psychiatric:         Mood and Affect: Mood normal.         Behavior: Behavior normal.        MOCA score 22/30    Result Review :   The following data was reviewed by: Clara Snowden MD on 04/28/2022:  Common labs    Common Labsle 11/16/21 2/17/22 2/17/22 2/17/22     0939 0939 0939   Glucose  88     BUN  19     Creatinine 0.70 0.76     eGFR Non  Am  81     eGFR African Am  94     Sodium  140     Potassium  4.5     Chloride  104     Calcium  9.1     Total Protein  6.3     Albumin  4.2     Total Bilirubin  0.5     Alkaline Phosphatase  51     AST (SGOT)  23     ALT (SGPT)  19     WBC    3.9   Hemoglobin    12.7   Hematocrit    39.2   Platelets    277   Total Cholesterol   246 (A)    Triglycerides   61    HDL Cholesterol   91    LDL Cholesterol    145 (A)    (A) Abnormal value       Comments are available for some flowsheets but are not being displayed.           Data reviewed: Radiologic studies including acoustic brain MRIs  and head CTs within the past 3 years          Assessment and Plan    Diagnoses and all orders for this visit:    1. Memory loss (Primary)    2. Hordeolum externum of left upper eyelid    Other orders  -     Cancel: Bilirubin,        1. Memory Loss  - DDx to include dementia, either Alzheimer or non-Alzheimer type  - She follows with Neurology s/p acoustic neuroma removal as well as neuropsych, concerned their could be a depression/anxiety component to dementia  - They are planning on transitioning her care to [facility]  - MOCA today in office  consistent with mild cognitive impairment  PLAN  - Discussed pursuing additional care through [facility]  - Encouraged her to continue seeing current Neurologist and neuropsychiatrist in the interim     2. Left upper eye stye  - Has been present for several weeks, worse recently without discharge  - No outward signs of infection on clinical exam  PLAN  - Counseled on regular hot compresses  - Instructed to call back with any worsening of symptoms or development of drainage or erythema at which time short course of abx may be needed    BMI is within normal parameters. No follow-up required.       30 mins spent in direct care of this patient including reviewing medical chart, performing interview of patient and spouse, documenting in chart, counseling, performing in office screening tools.     Follow Up   Return in about 3 months (around 2022) for follow up dementia.  Patient was given instructions and counseling regarding her condition or for health maintenance advice. Please see specific information pulled into the AVS if appropriate.     Paulie Munroe PGY1    Patient seen and evaluated with Dr. Munroe. Pertinent portions of history and exam repeated. Agree with assessment and plan as above. 68 yo F here with her spouse for discussion about her memory decline. Pt is currently following with neurology related to history of  acoustic neuroma and has psychiatrist but is looking to re-establish with memory care center at [facility] for multidisciplinary approach. Most recent PCP as also retired so  wanted to discuss overall care plan and establish a care team going forward for his wife. MOCA done today 22/30 so mild cognitive impairment currently present which is advanced for her age. Not on my memory based medications at this time but will hold on starting any of them until she is evaluated by her new team. Does have a stye on her R upper lid today, cont warm compresses and call back next week if not improving, would call in topical abx at that time.     Smita Snowden MD  [facility] [place] [facility]  Phone: [phone]  Fax: [phone]

## 2022-05-02 ENCOUNTER — PATIENT MESSAGE (OUTPATIENT)
Dept: INTERNAL MEDICINE | Facility: CLINIC | Age: 67
End: 2022-05-02

## 2022-05-02 RX ORDER — POLYMYXIN B SULFATE AND TRIMETHOPRIM 1; 10000 MG/ML; [USP'U]/ML
1 SOLUTION OPHTHALMIC EVERY 4 HOURS
Qty: 10 ML | Refills: 0 | Status: SHIPPED | OUTPATIENT
Start: 2022-05-02 | End: 2022-08-22

## 2022-05-02 NOTE — TELEPHONE ENCOUNTER
From: Ayesha Wilde  To: Clara Snowden MD  Sent: 5/2/2022 9:41 AM EDT  Subject: Minor medical problem    I have a stye on my eyelid, have been using warm compress on eyelid to help hasten it’s demise, and it is not budging. You offered eyedrops if the compresses did not work, and I am beginning to believe my attempts to heal myself are futile. I would appreciate an RX from [facility] to help me get rid of this abomination. My pharmacy number is [phone]. Thank you!

## 2022-05-09 RX ORDER — RALOXIFENE HYDROCHLORIDE 60 MG/1
TABLET, FILM COATED ORAL
Qty: 90 TABLET | OUTPATIENT
Start: 2022-05-09

## 2022-05-09 RX ORDER — RALOXIFENE HYDROCHLORIDE 60 MG/1
60 TABLET, FILM COATED ORAL DAILY
Qty: 90 TABLET | Refills: 1 | Status: SHIPPED | OUTPATIENT
Start: 2022-05-09 | End: 2022-11-14

## 2022-05-09 NOTE — TELEPHONE ENCOUNTER
Rx Refill Note  Requested Prescriptions     Pending Prescriptions Disp Refills   • raloxifene (EVISTA) 60 MG tablet       Sig: Take 1 tablet by mouth Daily.      Last office visit with prescribing clinician: 4/28/2022      Next office visit with prescribing clinician: Visit date not found            Darleen Roberts MA  05/09/22, 11:18 EDT

## 2022-05-17 ENCOUNTER — HOSPITAL ENCOUNTER (OUTPATIENT)
Dept: MAMMOGRAPHY | Facility: HOSPITAL | Age: 67
Discharge: HOME | End: 2022-05-17
Admitting: INTERNAL MEDICINE

## 2022-05-17 DIAGNOSIS — R92.8 ABNORMAL SCREENING MAMMOGRAM: Primary | ICD-10-CM

## 2022-05-17 DIAGNOSIS — Z12.31 SCREENING MAMMOGRAM, ENCOUNTER FOR: ICD-10-CM

## 2022-05-17 PROCEDURE — 77063 BREAST TOMOSYNTHESIS BI: CPT

## 2022-05-17 PROCEDURE — 77067 SCR MAMMO BI INCL CAD: CPT

## 2022-06-14 ENCOUNTER — HOSPITAL ENCOUNTER (OUTPATIENT)
Dept: ULTRASOUND IMAGING | Facility: HOSPITAL | Age: 67
Discharge: HOME | End: 2022-06-14

## 2022-06-14 ENCOUNTER — HOSPITAL ENCOUNTER (OUTPATIENT)
Dept: MAMMOGRAPHY | Facility: HOSPITAL | Age: 67
Discharge: HOME | End: 2022-06-14

## 2022-06-14 DIAGNOSIS — R92.8 ABNORMAL SCREENING MAMMOGRAM: ICD-10-CM

## 2022-06-14 PROCEDURE — 76642 ULTRASOUND BREAST LIMITED: CPT

## 2022-06-14 PROCEDURE — G0279 TOMOSYNTHESIS, MAMMO: HCPCS

## 2022-06-14 PROCEDURE — 77065 DX MAMMO INCL CAD UNI: CPT

## 2022-06-27 ENCOUNTER — APPOINTMENT (OUTPATIENT)
Dept: MAMMOGRAPHY | Facility: HOSPITAL | Age: 67
End: 2022-06-27

## 2022-11-14 RX ORDER — RALOXIFENE HYDROCHLORIDE 60 MG/1
60 TABLET, FILM COATED ORAL DAILY
Qty: 30 TABLET | Refills: 0 | Status: SHIPPED | OUTPATIENT
Start: 2022-11-14 | End: 2022-11-29 | Stop reason: SDUPTHER

## 2022-11-14 NOTE — TELEPHONE ENCOUNTER
Rx Refill Note  Requested Prescriptions     Pending Prescriptions Disp Refills   • raloxifene (EVISTA) 60 MG tablet [Pharmacy Med Name: RALOXIFENE 60MG TABLETS] 90 tablet 1     Sig: TAKE 1 TABLET BY MOUTH DAILY      Last office visit with prescribing clinician: Visit date not found      Next office visit with prescribing clinician: Visit date not found            Darleen Roberts MA  11/14/22, 14:50 EST

## 2022-11-15 RX ORDER — RALOXIFENE HYDROCHLORIDE 60 MG/1
60 TABLET, FILM COATED ORAL DAILY
Qty: 30 TABLET | Refills: 0 | OUTPATIENT
Start: 2022-11-15

## 2022-11-15 NOTE — TELEPHONE ENCOUNTER
HUB TO READ:INFORMED PATIENT YESTERDAY THAT SHE WOULD NEED AN APPT FOR ANY FURTHER REFILLS. CAN ONLY SEND IN 1 MONTH SUPPLY BECAUSE SHE HAS NOT BEEN SEEN SINCE APRIL

## 2022-11-15 NOTE — TELEPHONE ENCOUNTER
Caller: Ayesha Wilde    Relationship: Self    Best call back number: [phone]    Requested Prescriptions:   Requested Prescriptions     Pending Prescriptions Disp Refills   • raloxifene (EVISTA) 60 MG tablet 30 tablet 0     Sig: Take 1 tablet by mouth Daily.        [id number] [address] [facility] AT [facility] [phone] [facility] [phone] FX     Additional details provided by patient: PATIENT HAS 2 WEEKS LEFT ON THIS PRESCRIPTION.    PATIENT'S SPOUSE STATES THAT PATIENT WOULD LIKE A 90 DAY SUPPLY.     Does the patient have less than a 3 day supply:  [] Yes  [x] No    Ortiz Mcfarland Rep   11/15/22 08:45 EST

## 2022-11-29 ENCOUNTER — OFFICE VISIT (OUTPATIENT)
Dept: INTERNAL MEDICINE | Facility: CLINIC | Age: 67
End: 2022-11-29

## 2022-11-29 VITALS
SYSTOLIC BLOOD PRESSURE: 128 MMHG | HEART RATE: 99 BPM | HEIGHT: 65 IN | WEIGHT: 130 LBS | BODY MASS INDEX: 21.66 KG/M2 | DIASTOLIC BLOOD PRESSURE: 82 MMHG | RESPIRATION RATE: 16 BRPM | TEMPERATURE: 97.5 F | OXYGEN SATURATION: 99 %

## 2022-11-29 DIAGNOSIS — R41.3 MEMORY LOSS: ICD-10-CM

## 2022-11-29 DIAGNOSIS — M81.0 OSTEOPOROSIS OF LUMBAR SPINE: Primary | ICD-10-CM

## 2022-11-29 PROCEDURE — 99214 OFFICE O/P EST MOD 30 MIN: CPT | Performed by: INTERNAL MEDICINE

## 2022-11-29 RX ORDER — DONEPEZIL HYDROCHLORIDE 10 MG/1
TABLET, FILM COATED ORAL
COMMUNITY
Start: 2022-10-06 | End: 2023-01-22 | Stop reason: SDUPTHER

## 2022-11-29 RX ORDER — RALOXIFENE HYDROCHLORIDE 60 MG/1
60 TABLET, FILM COATED ORAL DAILY
Qty: 90 TABLET | Refills: 3 | Status: SHIPPED | OUTPATIENT
Start: 2022-11-29 | End: 2023-01-22 | Stop reason: SDUPTHER

## 2022-11-29 NOTE — ASSESSMENT & PLAN NOTE
- Continue raloxifene --> refills sent  - Discussed potential medication side effects  - Repeat DEXA due, ordered today  - Continue OTC calcium and vit D supplementation

## 2022-11-29 NOTE — PROGRESS NOTES
"Chief Complaint  Follow-up and Anxiety    Subjective          Ayesha Wilde presents to [facility] for follow-up.   She is here today to get refills of her Raloxifene. Takes the medication daily and tolerates well. No known side effects.  No recent falls or fractures. Also taking calcium and vitamin D daily.     Objective   Vital Signs:   /82   Pulse 99   Temp 97.5 °F (36.4 °C)   Resp 16   Ht 165.1 cm (65\")   Wt 59 kg (130 lb)   SpO2 99%   BMI 21.63 kg/m²     Physical Exam  Vitals and nursing note reviewed.   Constitutional:       General: She is not in acute distress.     Appearance: Normal appearance.   Eyes:      General: No scleral icterus.     Conjunctiva/sclera: Conjunctivae normal.   Cardiovascular:      Rate and Rhythm: Normal rate and regular rhythm.      Heart sounds: Normal heart sounds. No murmur heard.  Pulmonary:      Effort: Pulmonary effort is normal. No respiratory distress.      Breath sounds: Normal breath sounds.   Abdominal:      General: Bowel sounds are normal. There is no distension.      Palpations: Abdomen is soft.   Musculoskeletal:         General: No swelling.      Right lower leg: No edema.      Left lower leg: No edema.   Neurological:      General: No focal deficit present.      Mental Status: She is alert. Mental status is at baseline.        Result Review :   The following data was reviewed by: Clara Snowden MD on 11/29/2022:  CMP    CMP 2/17/22   Glucose 88   BUN 19   Creatinine 0.76   eGFR Non  Am 81   eGFR  Am 94   Sodium 140   Potassium 4.5   Chloride 104   Calcium 9.1   Total Protein 6.3   Albumin 4.2   Globulin 2.1   Total Bilirubin 0.5   Alkaline Phosphatase 51   AST (SGOT) 23   ALT (SGPT) 19      Comments are available for some flowsheets but are not being displayed.           CBC w/diff    CBC w/Diff 2/17/22   WBC 3.9   RBC 4.21   Hemoglobin 12.7   Hematocrit 39.2   MCV 93   MCH 30.2   MCHC " 32.4   RDW 12.3   Platelets 277   Neutrophil Rel % 54   Lymphocyte Rel % 35   Monocyte Rel % 8   Eosinophil Rel % 2   Basophil Rel % 1           Lipid Panel    Lipid Panel 2/17/22   Total Cholesterol 246 (A)   Triglycerides 61   HDL Cholesterol 91   VLDL Cholesterol 10   LDL Cholesterol  145 (A)   (A) Abnormal value            TSH    TSH 2/17/22   TSH 3.460               Data reviewed: Radiologic studies DEXA 2021            Assessment and Plan      Diagnoses and all orders for this visit:    1. Osteoporosis of lumbar spine (Primary)  Assessment & Plan:  - Continue raloxifene --> refills sent  - Discussed potential medication side effects  - Repeat DEXA due, ordered today  - Continue OTC calcium and vit D supplementation       2. Memory loss  Assessment & Plan:  - Seen by neuropsych & neurologist - told to do Lumosity Rhea daily (tests ok)  - Refills sent for Donepezil today         Follow Up   Return in about 4 months (around 3/29/2023) for Medicare Wellness.    Patient was given instructions and counseling regarding her condition or for health maintenance advice. Please see specific information pulled into the AVS if appropriate.     Gretta Henderson MD  Internal Medicine and Pediatrics, PGY-4    Patient seen and evaluated with Dr. Henderson. Pertinent portions of history and exam repeated. Agree with assessment and plan as above. 66 yo F here for follow up on osteoporosis and memory loss.  Tolerating medications well.  Does do crossword puzzles daily and get exercise.  Refill sent on Aricept.  Will order follow-up DEXA scan, started raloxifene after osteoporosis diagnosed in early 2021.  Refill sent on this medication today, will change therapy as indicated by DEXA scan results.    Smita Snowden MD  [facility] [place] [facility]  Phone: [phone]  Fax: [phone]

## 2022-11-29 NOTE — ASSESSMENT & PLAN NOTE
- Seen by neuropsych & neurologist - told to do [employer] Frog Rhea daily (tests ok)  - Refills sent for Donepezil today

## 2023-01-21 ENCOUNTER — PATIENT MESSAGE (OUTPATIENT)
Dept: INTERNAL MEDICINE | Facility: CLINIC | Age: 68
End: 2023-01-21
Payer: MEDICARE

## 2023-01-21 DIAGNOSIS — M81.0 OSTEOPOROSIS OF LUMBAR SPINE: ICD-10-CM

## 2023-01-22 RX ORDER — DONEPEZIL HYDROCHLORIDE 10 MG/1
10 TABLET, FILM COATED ORAL NIGHTLY
Qty: 90 TABLET | Refills: 1 | Status: SHIPPED | OUTPATIENT
Start: 2023-01-22

## 2023-01-22 RX ORDER — DESVENLAFAXINE 100 MG/1
100 TABLET, EXTENDED RELEASE ORAL DAILY
Qty: 90 TABLET | Refills: 1 | Status: SHIPPED | OUTPATIENT
Start: 2023-01-22 | End: 2023-03-27 | Stop reason: SDUPTHER

## 2023-01-22 RX ORDER — RALOXIFENE HYDROCHLORIDE 60 MG/1
60 TABLET, FILM COATED ORAL DAILY
Qty: 90 TABLET | Refills: 3 | Status: SHIPPED | OUTPATIENT
Start: 2023-01-22 | End: 2023-03-27 | Stop reason: SDUPTHER

## 2023-01-23 NOTE — TELEPHONE ENCOUNTER
From: Ayesha Wilde  To: Clara Snowden  Sent: 1/21/2023 3:55 PM EST  Subject: Re-send prescriptions    I have changed my Part D plan for 2023. Please re-send my prescription(s) to [facility], [address] [place] [address]. Phone [phone]. Fax [phone]    raloxifene 60 mg

## 2023-03-27 DIAGNOSIS — M81.0 OSTEOPOROSIS OF LUMBAR SPINE: ICD-10-CM

## 2023-03-27 RX ORDER — RALOXIFENE HYDROCHLORIDE 60 MG/1
60 TABLET, FILM COATED ORAL DAILY
Qty: 90 TABLET | Refills: 3 | Status: SHIPPED | OUTPATIENT
Start: 2023-03-27

## 2023-03-27 RX ORDER — DESVENLAFAXINE 100 MG/1
100 TABLET, EXTENDED RELEASE ORAL DAILY
Qty: 90 TABLET | Refills: 1 | Status: SHIPPED | OUTPATIENT
Start: 2023-03-27

## 2023-03-27 NOTE — TELEPHONE ENCOUNTER
Rx Refill Note  Requested Prescriptions     Pending Prescriptions Disp Refills   • raloxifene (EVISTA) 60 MG tablet 90 tablet 3     Sig: Take 1 tablet by mouth Daily.   • desvenlafaxine (PRISTIQ) 100 MG 24 hr tablet 90 tablet 1     Sig: Take 1 tablet by mouth Daily.      Last office visit with prescribing clinician: 11/29/2022   Last telemedicine visit with prescribing clinician: 3/30/2023   Next office visit with prescribing clinician: 3/30/2023                         Would you like a call back once the refill request has been completed: [] Yes [] No    If the office needs to give you a call back, can they leave a voicemail: [] Yes [] No    Darleen Roberts MA  03/27/23, 16:19 EDT

## 2023-03-27 NOTE — TELEPHONE ENCOUNTER
Caller: [employer]    Relationship: Emergency Contact    Best call back number: [phone]    Requested Prescriptions:   Requested Prescriptions     Pending Prescriptions Disp Refills   • raloxifene (EVISTA) 60 MG tablet 90 tablet 3     Sig: Take 1 tablet by mouth Daily.   • desvenlafaxine (PRISTIQ) 100 MG 24 hr tablet 90 tablet 1     Sig: Take 1 tablet by mouth Daily.        Pharmacy where request should be sent: [id number] - [place], [place] - [address] [phone] [facility] [phone] FX     Last office visit with prescribing clinician: 11/29/2022   Last telemedicine visit with prescribing clinician: 3/30/2023   Next office visit with prescribing clinician: 3/30/2023     Additional details provided by patient: HAS MORE THAN 3 DAYS.    Does the patient have less than a 3 day supply:  [] Yes  [x] No    Would you like a call back once the refill request has been completed: [] Yes [x] No    If the office needs to give you a call back, can they leave a voicemail: [] Yes [x] No    Gisela Gutiérrez, PCT   03/27/23 15:59 EDT

## 2023-03-30 ENCOUNTER — OFFICE VISIT (OUTPATIENT)
Dept: INTERNAL MEDICINE | Facility: CLINIC | Age: 68
End: 2023-03-30
Payer: MEDICARE

## 2023-03-30 VITALS
WEIGHT: 134 LBS | SYSTOLIC BLOOD PRESSURE: 100 MMHG | HEART RATE: 70 BPM | DIASTOLIC BLOOD PRESSURE: 72 MMHG | TEMPERATURE: 98 F | BODY MASS INDEX: 22.33 KG/M2 | HEIGHT: 65 IN | RESPIRATION RATE: 16 BRPM | OXYGEN SATURATION: 99 %

## 2023-03-30 DIAGNOSIS — E78.2 MODERATE MIXED HYPERLIPIDEMIA NOT REQUIRING STATIN THERAPY: ICD-10-CM

## 2023-03-30 DIAGNOSIS — E55.9 VITAMIN D DEFICIENCY: ICD-10-CM

## 2023-03-30 DIAGNOSIS — Z86.010 HISTORY OF ADENOMATOUS POLYP OF COLON: ICD-10-CM

## 2023-03-30 DIAGNOSIS — G31.84 MCI (MILD COGNITIVE IMPAIRMENT): ICD-10-CM

## 2023-03-30 DIAGNOSIS — M81.0 OSTEOPOROSIS OF LUMBAR SPINE: ICD-10-CM

## 2023-03-30 DIAGNOSIS — Z80.0 FAMILY HISTORY OF COLON CANCER: ICD-10-CM

## 2023-03-30 DIAGNOSIS — Z00.00 MEDICARE ANNUAL WELLNESS VISIT, SUBSEQUENT: Primary | ICD-10-CM

## 2023-03-30 DIAGNOSIS — Z12.31 BREAST CANCER SCREENING BY MAMMOGRAM: ICD-10-CM

## 2023-03-30 LAB
BILIRUB BLD-MCNC: NEGATIVE MG/DL
CLARITY, POC: CLEAR
COLOR UR: YELLOW
EXPIRATION DATE: NORMAL
GLUCOSE UR STRIP-MCNC: NEGATIVE MG/DL
KETONES UR QL: NEGATIVE
LEUKOCYTE EST, POC: NEGATIVE
Lab: NORMAL
NITRITE UR-MCNC: NEGATIVE MG/ML
PH UR: 6 [PH] (ref 5–8)
PROT UR STRIP-MCNC: NEGATIVE MG/DL
RBC # UR STRIP: NEGATIVE /UL
SP GR UR: 1.02 (ref 1–1.03)
UROBILINOGEN UR QL: NORMAL

## 2023-03-30 RX ORDER — ALPRAZOLAM 0.5 MG/1
0.25 TABLET ORAL
COMMUNITY

## 2023-03-30 RX ORDER — MEMANTINE HYDROCHLORIDE 5 MG/1
1 TABLET ORAL EVERY 12 HOURS SCHEDULED
COMMUNITY
Start: 2023-02-02

## 2023-03-30 NOTE — PROGRESS NOTES
Subsequent Medicare Wellness Visit   The ABC's of the Annual Wellness Visit    Chief Complaint   Patient presents with   • Annual Exam       HPI:  Ayesha Wilde, PAT-[id number], is a 68 y.o. female who presents for a Subsequent Medicare Wellness Visit.    Recent Hospitalizations:  No hospitalization(s) within the last year..    Current Medical Providers:  Patient Care Team:  Clara Snowden MD as PCP - General (Internal Medicine & Pediatrics)  Alize Spain MD as Consulting Physician (General Surgery)  Bebeto Perry MD as Consulting Physician (Dermatology)  Crystal Hernandez MD as Consulting Physician (Ophthalmology)  Chidi Daniel DO as Consulting Physician (Neurology)  Jacqueline Mcarthur APRN (Nurse Practitioner)    Health Habits and Functional and Cognitive Screening and Depression Screening:  Functional & Cognitive Status 3/30/2023   Do you have difficulty preparing food and eating? No   Do you have difficulty bathing yourself, getting dressed or grooming yourself? No   Do you have difficulty using the toilet? No   Do you have difficulty moving around from place to place? No   Do you have trouble with steps or getting out of a bed or a chair? No   Current Diet Well Balanced Diet   Dental Exam Up to date   Eye Exam Not up to date   Exercise (times per week) 3 times per week   Current Exercises Include Light Weights   Do you need help using the phone?  No   Are you deaf or do you have serious difficulty hearing?  No   Do you need help with transportation? No   Do you need help shopping? No   Do you need help preparing meals?  No   Do you need help with housework?  No   Do you need help with laundry? No   Do you need help taking your medications? No   Do you need help managing money? No   Do you ever drive or ride in a car without wearing a seat belt? No   Have you felt unusual stress, anger or loneliness in the last month? No   Who do you live with? Spouse   If you need help, do you have trouble  finding someone available to you? No   Have you been bothered in the last four weeks by sexual problems? No   Do you have difficulty concentrating, remembering or making decisions? No       Compared to one year ago, the patient feels her physical health is the same and her mental health is the same.    Depression Screen:  PHQ-2/PHQ-9 Depression Screening 3/30/2023   Retired PHQ-9 Total Score -   Retired Total Score -   Little Interest or Pleasure in Doing Things 0-->not at all   Feeling Down, Depressed or Hopeless 0-->not at all   PHQ-9: Brief Depression Severity Measure Score 0         Past Medical/Family/Social History:  The following portions of the patient's history were reviewed and updated as appropriate: allergies, current medications, past family history, past medical history, past social history, past surgical history and problem list.    Allergies   Allergen Reactions   • Lanolin Unknown - Low Severity   • Codeine          Current Outpatient Medications:   •  ALPRAZolam (XANAX) 0.5 MG tablet, Take 0.25 mg by mouth., Disp: , Rfl:   •  BIOTIN FORTE PO, Take 1,000 mcg by mouth Daily., Disp: , Rfl:   •  Calcium Polycarbophil (FIBER-CAPS PO), Take  by mouth Daily. Take 2 capsules daily, Disp: , Rfl:   •  Cyanocobalamin (VITAMIN B 12) 500 MCG tablet, Take 1 tablet by mouth Daily., Disp: , Rfl:   •  desvenlafaxine (PRISTIQ) 100 MG 24 hr tablet, Take 1 tablet by mouth Daily., Disp: 90 tablet, Rfl: 1  •  donepezil (ARICEPT) 10 MG tablet, Take 1 tablet by mouth Every Night., Disp: 90 tablet, Rfl: 1  •  lysine acetate 500 MG tablet tablet, Take  by mouth daily., Disp: , Rfl:   •  memantine (NAMENDA) 5 MG tablet, Take 1 tablet by mouth Every 12 (Twelve) Hours., Disp: , Rfl:   •  Omega-3 Fatty Acids (FISH OIL) 1000 MG capsule capsule, Take  by mouth daily with breakfast., Disp: , Rfl:   •  raloxifene (EVISTA) 60 MG tablet, Take 1 tablet by mouth Daily., Disp: 90 tablet, Rfl: 3  •  vitamin B-6 (PYRIDOXINE) 50 MG tablet,  Take 1 tablet by mouth Daily., Disp: , Rfl:   •  aspirin 81 MG chewable tablet, Chew 1 tablet Daily., Disp: , Rfl:     Aspirin use counseling: Does not need ASA (and currently is not on it)    Current medication list contains no high risk medications.  No harmful drug interactions have been identified.     Family History   Problem Relation Age of Onset   • Colon cancer Mother    • Cancer Mother    • Hypertension Father    • Cancer Father         Lung cancer.     • Colon cancer Brother    • Heart disease Brother    • Alcohol abuse Brother    • Thyroid disease Brother    • Alzheimer's disease Sister    • Cancer Brother             • Heart disease Brother          May 2016   • Cancer Maternal Grandmother             • Hearing loss Daughter    • Thyroid disease Brother    • Other Sister         Brandon       Social History     Tobacco Use   • Smoking status: Former     Years: 10.00     Types: Cigarettes     Quit date:      Years since quittin.2   • Smokeless tobacco: Never   Substance Use Topics   • Alcohol use: Not Currently     Alcohol/week: 0.0 standard drinks       Past Surgical History:   Procedure Laterality Date   • BRAIN SURGERY      acoustic neuroma   • BREAST BIOPSY     • BREAST SURGERY     •  SECTION     • COLONOSCOPY N/A 2013    Recall in    • NEUROMA SURGERY  1994    Acoustic   • PAP SMEAR N/A 2015       Patient Active Problem List   Diagnosis   • Hyperlipidemia   • Lumbago   • Anxiety   • Vitamin D deficiency   • Family history of colon cancer   • Chronic fatigue   • History of adenomatous polyp of colon   • Memory loss   • Family history of Alzheimer's disease   • Osteoporosis of lumbar spine   • Chronic pain of both hips   • Observed sleep apnea   • History of colon polyps   • Fibrocystic breast   • Diverticulosis   • History of acoustic neuroma   • Acoustic neuroma (HCC)   • MCI (mild cognitive impairment)       Review of Systems  "  Constitutional: Negative for appetite change, chills and fever.   HENT: Negative for hearing loss and sore throat.    Eyes: Negative for visual disturbance.   Respiratory: Negative for cough and shortness of breath.    Cardiovascular: Negative for chest pain, palpitations and leg swelling.   Gastrointestinal: Negative for constipation, diarrhea and vomiting.   Genitourinary: Negative for difficulty urinating and frequency.   Musculoskeletal: Negative for arthralgias and myalgias.   Skin: Negative for rash.   Neurological: Negative for weakness and headaches.   Hematological: Negative for adenopathy.   Psychiatric/Behavioral: Positive for confusion and decreased concentration. Negative for sleep disturbance. The patient is nervous/anxious.        Objective     Vitals:    03/30/23 0930   BP: 100/72   Pulse: 70   Resp: 16   Temp: 98 °F (36.7 °C)   SpO2: 99%   Weight: 60.8 kg (134 lb)   Height: 165.1 cm (65\")       BMI is within normal parameters. No other follow-up for BMI required.      Pt has diagnosis of new MCI, is actively on donepizil and memantine, managed by neurology and psychiatry    Physical Exam  Vitals and nursing note reviewed.   Constitutional:       General: She is not in acute distress.     Appearance: Normal appearance.   HENT:      Head: Normocephalic and atraumatic.      Right Ear: Tympanic membrane and ear canal normal.      Left Ear: Tympanic membrane and ear canal normal.      Nose: Nose normal.      Mouth/Throat:      Mouth: Mucous membranes are moist.      Pharynx: Oropharynx is clear.   Eyes:      Extraocular Movements: Extraocular movements intact.      Conjunctiva/sclera: Conjunctivae normal.      Pupils: Pupils are equal, round, and reactive to light.   Cardiovascular:      Rate and Rhythm: Normal rate and regular rhythm.      Heart sounds: Normal heart sounds. No murmur heard.  Pulmonary:      Effort: Pulmonary effort is normal. No respiratory distress.      Breath sounds: Normal breath " sounds. No wheezing or rhonchi.   Abdominal:      General: Bowel sounds are normal. There is no distension.      Palpations: Abdomen is soft. There is no mass.      Tenderness: There is no abdominal tenderness.      Comments: no hepatosplenomegaly   Musculoskeletal:         General: No deformity. Normal range of motion.      Cervical back: Normal range of motion and neck supple.   Skin:     General: Skin is warm and dry.      Capillary Refill: Capillary refill takes less than 2 seconds.      Findings: No lesion or rash.   Neurological:      General: No focal deficit present.      Mental Status: She is alert and oriented to person, place, and time.      Cranial Nerves: No cranial nerve deficit.   Psychiatric:         Mood and Affect: Mood normal.         Behavior: Behavior normal.         Judgment: Judgment normal.         Brief Urine Lab Results  (Last result in the past 365 days)      Color   Clarity   Blood   Leuk Est   Nitrite   Protein   CREAT   Urine HCG        03/30/23 1651 Yellow   Clear   Negative   Negative   Negative   Negative                 Recent Lab Results:     Lab Results   Component Value Date    CHOL 245 (H) 12/27/2019    TRIG 61 02/17/2022    HDL 91 02/17/2022    VLDL 10 02/17/2022    LDLHDL 1.23 12/27/2019       Assessment & Plan   Age-appropriate Screening Schedule:  Refer to the list below for future screening recommendations based on patient's age, sex and/or medical conditions.      Health Maintenance   Topic Date Due   • LIPID PANEL  02/17/2023   • COLORECTAL CANCER SCREENING  04/27/2023   • DXA SCAN  05/03/2023   • ANNUAL WELLNESS VISIT  03/30/2024   • MAMMOGRAM  06/14/2024   • TDAP/TD VACCINES (2 - Td or Tdap) 04/28/2026   • HEPATITIS C SCREENING  Completed   • COVID-19 Vaccine  Completed   • INFLUENZA VACCINE  Completed   • Pneumococcal Vaccine 65+  Completed   • ZOSTER VACCINE  Completed   • PAP SMEAR  Discontinued       Immunization History   Administered Date(s) Administered   •  COVID-19 (PFIZER) BIVALENT BOOSTER 12+YRS 01/18/2023   • COVID-19 (PFIZER) PURPLE CAP 03/01/2021, 03/22/2021, 09/28/2021   • Covid-19 (Pfizer) Gray Cap 04/21/2022   • FluLaval/Fluzone >6mos 09/12/2019   • Fluad Quad 65+ 09/01/2021   • Fluzone High Dose =>65 Years ([employer]ax[employer] ONLY) 09/02/2020, 09/01/2021   • Fluzone High-Dose 65+yrs 09/02/2020, 09/01/2021   • Fluzone Quad >6mos (Multi-dose) 11/06/2017, 09/17/2018   • Hepatitis A 05/12/2018   • Influenza Quad Vaccine (Inpatient) 11/06/2017   • Pneumococcal Conjugate 13-Valent (PCV13) 11/02/2020   • Pneumococcal Polysaccharide (PPSV23) 02/17/2022   • Shingrix 09/17/2018   • Tdap 04/28/2016         Medicare Risks and Personalized Health Plan:  Advance Directive Discussion  Alcohol Misuse  Breast Cancer/Mammogram Screening  Cardiovascular risk  Colon Cancer Screening  Dementia/Memory   Depression/Dysphoria  Diabetic Lab Screening   Fall Risk  Glaucoma Risk  Hearing Problem  Immunizations Discussed/Encouraged (specific immunizations; Tdap, Hepatitis A Vaccine/Series, Influenza, Pneumococcal 23, Prevnar 20 (Pneumococcal 20-valent conjugate), Shingrix and COVID19 )  Obesity/Overweight   Osteoporosis Risk  Polypharmacy  Urinary Incontinence      CMS-Preventive Services Quick Reference  Medicare Preventive Services Addressed:  Annual Wellness Visit (AWV)  Bone Density Measurements  Colorectal Cancer Screening, Colonoscopy  Screening Mammography   Screening Pap Tests    Advance Care Planning:  ACP discussion was held with the patient during this visit. Patient has an advance directive in EMR which is still valid.     Annual Wellness  - Labs ordered today including CBC, CMP, Fasting lipid panel, TSH and Vit D. Will call with results once available and make follow up plan and med changes as appropriate.   - Cont current medications for chronic medical issues, refills sent as needed today.   - EKG not indicated  - PAP smear not indicated  - Mammo due in May, ordered today to be  scheduled after due date  - Cscope due after April of this year, ordered today to be scheduled after due date  - DEXA due, is currently scheduled for July  - Lung CA screening not indicated  - Immunizations: Flu shot UTD. COVID series and boosters UTD. TDaP done 2016, UTD. Shingrix series completed. Prevnar and Pneumovax UTD.    - Depression screen reviewed with patient and negative. Currently follows with psychiatry and is medically managed and feels stable. Does continue to struggle with MCI and is on 2 memory agents at this time managed by neurology.   - Advised behavioral modifications including dietary changes and increased exercise with goal of healthy weight and lifestyle.       Diagnoses and all orders for this visit:    1. Medicare annual wellness visit, subsequent (Primary)  -     CBC & Differential  -     Comprehensive Metabolic Panel  -     TSH  -     POC Urinalysis Dipstick, Automated    2. Moderate mixed hyperlipidemia not requiring statin therapy  -     Lipid Panel    3. Osteoporosis of lumbar spine  -     Vitamin D,25-Hydroxy    4. Vitamin D deficiency  -     Vitamin D,25-Hydroxy    5. Family history of colon cancer  -     Ambulatory Referral For Screening Colonoscopy    6. History of adenomatous polyp of colon  -     Ambulatory Referral For Screening Colonoscopy    7. MCI (mild cognitive impairment)    8. Breast cancer screening by mammogram  -     Mammo Screening Bilateral With CAD; Future        An After Visit Summary and PPPS with all of these plans were given to the patient.      Follow Up:  Return in about 1 year (around 3/30/2024) for Medicare Wellness.        Smita Snowden MD  [facility] [place] [facility]  Phone: [phone]  Fax: [phone]

## 2023-03-31 LAB
25(OH)D3+25(OH)D2 SERPL-MCNC: 59.6 NG/ML (ref 30–100)
ALBUMIN SERPL-MCNC: 4.3 G/DL (ref 3.5–5.2)
ALBUMIN/GLOB SERPL: 2 G/DL
ALP SERPL-CCNC: 57 U/L (ref 39–117)
ALT SERPL-CCNC: 22 U/L (ref 1–33)
AST SERPL-CCNC: 23 U/L (ref 1–32)
BASOPHILS # BLD AUTO: 0.05 10*3/MM3 (ref 0–0.2)
BASOPHILS NFR BLD AUTO: 0.9 % (ref 0–1.5)
BILIRUB SERPL-MCNC: 0.4 MG/DL (ref 0–1.2)
BUN SERPL-MCNC: 19 MG/DL (ref 8–23)
BUN/CREAT SERPL: 26.8 (ref 7–25)
CALCIUM SERPL-MCNC: 9.8 MG/DL (ref 8.6–10.5)
CHLORIDE SERPL-SCNC: 104 MMOL/L (ref 98–107)
CHOLEST SERPL-MCNC: 267 MG/DL (ref 0–200)
CO2 SERPL-SCNC: 29.8 MMOL/L (ref 22–29)
CREAT SERPL-MCNC: 0.71 MG/DL (ref 0.57–1)
EGFRCR SERPLBLD CKD-EPI 2021: 92.7 ML/MIN/1.73
EOSINOPHIL # BLD AUTO: 0.06 10*3/MM3 (ref 0–0.4)
EOSINOPHIL NFR BLD AUTO: 1.1 % (ref 0.3–6.2)
ERYTHROCYTE [DISTWIDTH] IN BLOOD BY AUTOMATED COUNT: 12.3 % (ref 12.3–15.4)
GLOBULIN SER CALC-MCNC: 2.1 GM/DL
GLUCOSE SERPL-MCNC: 86 MG/DL (ref 65–99)
HCT VFR BLD AUTO: 40 % (ref 34–46.6)
HDLC SERPL-MCNC: 107 MG/DL (ref 40–60)
HGB BLD-MCNC: 13.1 G/DL (ref 12–15.9)
IMM GRANULOCYTES # BLD AUTO: 0.01 10*3/MM3 (ref 0–0.05)
IMM GRANULOCYTES NFR BLD AUTO: 0.2 % (ref 0–0.5)
LDLC SERPL CALC-MCNC: 149 MG/DL (ref 0–100)
LYMPHOCYTES # BLD AUTO: 1.47 10*3/MM3 (ref 0.7–3.1)
LYMPHOCYTES NFR BLD AUTO: 27.4 % (ref 19.6–45.3)
MCH RBC QN AUTO: 30.5 PG (ref 26.6–33)
MCHC RBC AUTO-ENTMCNC: 32.8 G/DL (ref 31.5–35.7)
MCV RBC AUTO: 93 FL (ref 79–97)
MONOCYTES # BLD AUTO: 0.34 10*3/MM3 (ref 0.1–0.9)
MONOCYTES NFR BLD AUTO: 6.3 % (ref 5–12)
NEUTROPHILS # BLD AUTO: 3.43 10*3/MM3 (ref 1.7–7)
NEUTROPHILS NFR BLD AUTO: 64.1 % (ref 42.7–76)
NRBC BLD AUTO-RTO: 0 /100 WBC (ref 0–0.2)
PLATELET # BLD AUTO: 267 10*3/MM3 (ref 140–450)
POTASSIUM SERPL-SCNC: 4.6 MMOL/L (ref 3.5–5.2)
PROT SERPL-MCNC: 6.4 G/DL (ref 6–8.5)
RBC # BLD AUTO: 4.3 10*6/MM3 (ref 3.77–5.28)
SODIUM SERPL-SCNC: 141 MMOL/L (ref 136–145)
TRIGL SERPL-MCNC: 70 MG/DL (ref 0–150)
TSH SERPL DL<=0.005 MIU/L-ACNC: 3.91 UIU/ML (ref 0.27–4.2)
VLDLC SERPL CALC-MCNC: 11 MG/DL (ref 5–40)
WBC # BLD AUTO: 5.36 10*3/MM3 (ref 3.4–10.8)

## 2023-09-04 ENCOUNTER — E-VISIT (OUTPATIENT)
Dept: FAMILY MEDICINE CLINIC | Facility: TELEHEALTH | Age: 68
End: 2023-09-04
Payer: MEDICARE

## 2023-09-04 PROCEDURE — BRIGHTMDVISIT: Performed by: NURSE PRACTITIONER

## 2023-09-04 NOTE — E-VISIT TREATED
Chief Complaint: Mouth sores   Patient introduction   Patient is 68-year-old female who presents with swelling, sores or blisters, and redness on or below their lower lip.   Number of lesions: 1.   Symptoms have been present for 1 to 3 days.   General presentation   No fever.   Surface sensations include pain/tenderness. Condition interferes somewhat with talking, eating, or drinking. No difficulty or pain when swallowing liquids and solid food.   No facial edema. No chills, loss of appetite, sore throat, headache, or muscle aches. No swollen lymph nodes.   Felt tingling/pain/burning sensation in same location prior to symptom onset. Patient had increased stress or life changes just prior to symptom onset.   Does not wear dentures.   Not taking non-prescription acetaminophen, clotrimazole, ibuprofen, miconazole, topical benzocaine, or zinc oxide for current symptoms.   History of similar mouth symptoms in the past, with 1 instance of similar symptoms in the past 1 year. Did not see a healthcare provider for similar symptoms in the past.   Sexual history: Has had vaginal, anal, or oral sex in past 3 months. Has not had multiple partners in past 3 months. Does not use injection drugs. Sexual partner does not use injection drugs.   Review of red flags/alarm symptoms:    No premalignant mouth lesions    No oral cancer    No unexplained sores or rash-like blisters on other parts of the body    No celiac disease    No neutropenia    No inflammatory bowel disease    No lupus    No Behcet's syndrome    No Anand's syndrome   Pregnancy/menstrual status/breastfeeding:   Patient is postmenopausal.   Current medications   Currently taking desvenlafaxine 100 MG 24 hr tablet, lysine acetate 500 MG tablet tablet, vitamin B-6 50 MG tablet, Vitamin B 12 500 MCG tablet, fish oil 1000 MG capsule capsule, donepezil 10 MG tablet, FIBER-CAPS PO, raloxifene 60 MG tablet, memantine 5 MG tablet, and BIOTIN FORTE PO.   Medication  allergies   None.   Medication contraindication review   No history of aspirin triad, NSAID-induced asthma/urticaria, or CABG surgery.   Past medical history   Immune conditions: No immunocompromising conditions.   No history of cancer.   Social history   Patient does not use tobacco. Does not drink alcohol.   Patient-submitted comments   Patient was asked if they had anything to add about their symptoms. Patient writes: I have been getting fever blisters 2-3 times per year since 1998. I have used acyclovir to treat them but have run out. Could I get a prescription for acyclovir, unless there is a more effective treatment available now. .   Patient did not request an excuse note.   Assessment   Herpes labialis.   Plan   Medications:    valacyclovir 1 gram tablet RX 1000mg 1 tab PO q12h 7d until the full prescribed amount is finished. Drink plenty of water while taking this medication. Amount is 14 tab.   The patient's prescription will be sent to:   [id number]   [address] [place] [address]   Phone: [phone]     Fax: [phone]   Education:    Condition and causes    Prevention    Treatment and self-care    When to call provider   ----------   Electronically signed by RONNIE Freeman on 2023-09-04 at 09:40AM   ----------   Patient Interview Transcript:   Are your symptoms on the inside or the outside of your mouth? - Inside includes your tongue, inner cheek or lips, gums, or roof of mouth - Outside includes your lips, skin surrounding lips, or corners of mouth Select all that apply.    Outside   Not selected:    Inside   Which areas outside your mouth are affected? Select all that apply.    On or just below my lower lip   Not selected:    On or just above my upper lip    The corners of my mouth   Which of these best describe the symptoms outside your mouth? Select all that apply.    Swelling    Sores or blisters    Redness   Not selected:    Cracking    Scaling or flaking skin     Darkening of the skin   How long have you had these symptoms? Select one.    1 to 3 days   Not selected:    4 to 6 days    7 to 10 days    More than 10 days   Do you have any of these in the affected area of your mouth? Select all that apply.    Pain or tenderness   Not selected:    Itching    Burning    A cottony feeling    Loss of taste    None of the above   How many mouth lesions, sores, or bumps do you have? Select one.    1   Not selected:    2 to 4    5 to 10    More than 10   Mouth sores can interfere with talking, eating, or drinking. How much does the mouth sore affect your ability to do these things? Select one.    Somewhat, but I can still talk, eat, and drink   Not selected:    Not at all    It makes talking, eating, or drinking impossible   Is it difficult or painful for you to swallow liquids and solid food? Select one.    No   Not selected:    Yes   Is there any swelling, redness, or warmth on your cheek or any other part of your face (not including the mouth area)? Select one.    No, not that I can tell   Not selected:    Yes   Before your symptoms began, did you feel any tingling, pain, or burning in the area? Select one.    Yes   Not selected:    No, not that I remember   Ready to send photos? If you choose not to send photos, you may need to speak to a provider to get care. Select one.    OK, I'll send photos   Not selected:    No, I'd rather not send photos   Send up to three photos for the provider to review: - Don't use a flash. - Make sure the photos are in focus. - Take at least one close-up photo of the affected area. - Take a photo showing the location in or around your mouth. - Take a photo showing your entire face.    Upload 1    Upload 2    Upload 3   Do you have tightness or discomfort when opening your mouth wide? Select one.    No   Not selected:    Yes   Do you have any sores (or rash-like blisters) on other parts of your body that can't be explained by another condition? Select  all that apply.    No   Not selected:    Chin    Nose    Cheeks    Forehead    Arms, legs, or upper body    Palms of hands    Soles of feet    Genitals   Do you have any of these symptoms? Select all that apply.    None of the above   Not selected:    Chills    Loss of appetite    Sore throat    Headache    Muscle aches   Have you had a fever along with your mouth symptoms? Select one.    No, not that I know of   Not selected:    Yes   Are your glands/lymph nodes swollen or painful to the touch?    No, not that I can tell   Not selected:    Yes   Just before your symptoms began, did any of these apply to you? Select all that apply.    Increased stress or major life change   Not selected:    Dental work    Extended sun exposure (or sunburn)    Onset of menstrual period    Fever or viral infection (such as cold or flu)    None of the above   Do you wear dentures? Select one.    No   Not selected:    Yes   Have you had these symptoms before? Select one.    Yes   Not selected:    No, not that I remember   In the past year, how many times have you had these symptoms? Select one.    1   Not selected:    0    2    3    More than 3    I'm not sure   When you had these symptoms before, did you see a healthcare provider? Select one.    No   Not selected:    Yes   In the last 3 months, have either of these applied to you? Select all that apply.    Neither of the above   Not selected:    Weight loss of 10 pounds or more without trying to lose weight    Severe fatigue or tiredness that doesn't improve with rest   In the last 3 months, have you had any of these symptoms that couldn't be explained by another condition? Select all that apply.    None of the above   Not selected:    Repeated episodes of diarrhea or bloody stools    Eye symptoms (pain, redness, discharge, sensitivity to light, or vision changes)    Urinary symptoms (burning with urination, blood in the urine, or frequent urination)    Joint symptoms (pain, redness,  warmth, or swelling)   Have you ever been diagnosed with any of these conditions? Select all that apply.    None of the above   Not selected:    Behcet's syndrome    Celiac disease    Diabetes    Mouth cancer, such as squamous cell cancer or melanoma    Neutropenia    Nutritional deficiency (vitamin B-12, zinc, folic acid, iron deficiency, or malnutrition)    Premalignant (precancerous) mouth lesions (leukoplakia, erythroplakia)    Reactive arthritis (Anand's syndrome)   Have you had any of these conditions? Select all that apply.    No, not that I know of   Not selected:    Aspirin triad (Samter's triad, or aspirin-sensitive asthma)    History of asthma or hives caused by NSAID drugs (aspirin, ibuprofen, or naproxen)    Coronary artery bypass graft (CABG) surgery   Do you have any of these conditions that can affect the immune system? Scroll to see all options. Select all that apply.    None of these   Not selected:    History of bone marrow transplant    Chronic kidney disease    Chronic liver disease (including cirrhosis)    HIV/AIDS    Inflammatory bowel disease (Crohn's disease or ulcerative colitis)    Lupus    Moderate to severe plaque psoriasis    Multiple sclerosis    Rheumatoid arthritis    Sickle cell anemia    Alpha or beta thalassemia    History of solid organ transplant (kidney, liver, or heart)    History of spleen removal    An autoimmune disorder not listed here (specify)    A condition requiring treatment with long-term use of oral steroids (such as prednisone, prednisolone, or dexamethasone) (specify)   Have you ever been diagnosed with cancer? Select one.    No   Not selected:    Yes, I have cancer now    Yes, but I'm in remission   Have you gone through menopause? Select one.    Yes   Not selected:    No    I'm going through it now   Using tobacco can put you at risk for more serious mouth conditions. Do you use any tobacco products? This includes smoking, vaping, snorting, or chewing tobacco.  Select one.    No, never   Not selected:    Yes, every day    Yes, some days    No, I quit   Do you drink alcohol? Select one.    No   Not selected:    1 drink or less per week    2 to 5 drinks per week    1 to 2 drinks per day    3 or more drinks per day   In the last 3 months, have you used any injection drugs, such as heroin, cocaine, crystal meth, amphetamines, or opiates? Your response to this question will only be shared with your care provider. Select one.    No   Not selected:    Yes   In the last 3 months, have you had vaginal, anal, or oral sex? Select one.    Yes   Not selected:    No   In the last 3 months, have you had sex with more than one partner? Select one.    No   Not selected:    Yes   In the last 3 months, has your partner used any injection drugs, such as heroin, cocaine, crystal meth, amphetamines, or opiates? Select one.    No, not that I know of   Not selected:    Yes   The next set of questions is about medications and medication-related allergies. Have you used any of these non-prescription medications for your current symptoms? Select all that apply.    None of the above   Not selected:    Acetaminophen (Tylenol)    Benzocaine topical gel (Anbesol, Zilactin-B)    Clotrimazole (Desenex, Micotrin)    Ibuprofen (Advil, Motrin)    Miconazole (Micaderm, Micatin)    Zinc oxide   Is Ayesha still taking these medications listed in their medical record? If they're not taking any of these, click Next. Select all that apply.    desvenlafaxine 100 MG 24 hr tablet    lysine acetate 500 MG tablet tablet    vitamin B-6 50 MG tablet    Vitamin B 12 500 MCG tablet    fish oil 1000 MG capsule capsule    donepezil 10 MG tablet    FIBER-CAPS PO    raloxifene 60 MG tablet    memantine 5 MG tablet    BIOTIN FORTE PO   Is Ayesha taking any other medications, vitamins, or supplements? Select one.    No   Not selected:    Yes   Have you ever had an allergic or bad reaction to any medication? Select one.    No   Not  selected:    Yes   Do you need a doctor's note? A doctor's note confirms that you received care today and states when you can return to school or work. It does not contain information about your diagnosis or treatment plan. Your provider will make the final decision on whether to give you a doctor's note. Doctor's notes CANNOT be backdated. Select one.    No   Not selected:    Today only (1 day)    Today and tomorrow (2 days)    3 days   Is there anything you'd like to add about Ayesha's symptoms? Please limit your comments to the symptoms covered in this interview. If you include comments about other concerns, your provider may recommend that Ayesha be seen in person.    I have been getting fever blisters 2-3 times per year since 1998. I have used acyclovir to treat them but have run out. Could I get a prescription for acyclovir, unless there is a more effective treatment available now.   ----------   Medical history   Medical history data does not currently exist for this patient.

## 2023-09-04 NOTE — EXTERNAL PATIENT INSTRUCTIONS
[image:  data:image/svg+xml;base64,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 SKJkHW08FQYpYFF4VwF9YnchIYGyGoZ1IIJlPETwHzQcLVBxDlB7Pw75O7TzfId+VjcmMGsuEDBmMVkcWYQtw6TuHjGooYFjqr0pg0Egk5EnQNS9pa8nkULvh5FhiPnbvBXmSEF5Rx5wOP40SQP8QDCfBsScQrlaRI79TINtMJFoOmHcNR1oGBTtRITpDo83G3LlzCc+WjirGCbcSUFwTJrmZPHoXYvbJqJdnNIujb9sk7Jum4SdBUH9ue0ttXfqQKkwjVCkLIY7vq6lKP97nJDdUSOgDJ1fONMcDQG6ZlRwEDOsDXBrQU0yDuIyAX52MLOkLLTrPDQkCu49KKA0NZR4GMW9JsFgZGevDmJoSDBuJW5fCqXyMW34HXUxPnB4GK73ZYCfPNC0GeQaARGkNuN5Sy09MoFqNC82IziaPL7cYBXdVghzSP51GdQrGhukAWutUOPvFe37QSA4PNWpEIN6ojY+MM2sHBAwZkm4D4S6Iy1=]   Diagnosis   Cold sores (herpes labialis)   My name is RONNIE Freeman, and I'm a healthcare provider at [facility]. I've reviewed your photos and responses. Your symptoms suggest you have a cold sore. Cold sores usually go away on their own in a week or two.   [image:  data:image/svg+xml;base64,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]   Medications   Your pharmacy   [facility] [address] [place], [place] [address] [phone]     Prescription   Valacyclovir oral  "tablet (1000mg): Take 1 tablet by mouth every 12 hours for 7 days, until the full prescribed amount is finished. Drink plenty of water while taking this medication.   [image: data:image/svg+xml;base64,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]   About your diagnosis   Cold sores are small, fluid-filled blisters that develop on or around the lips. They're also commonly known as \"fever blisters.\" Other areas around the mouth can be affected, including under the " "nose and just above the chin. Cold sores often come grouped together in patches. As the blisters break open, the shallow, open sores will ooze and then crust over.   Cold sores are caused by the herpes simplex virus (HSV). Cold sores are contagious. The virus is easily spread through contact with the cold sore itself or the fluid in the blisters. Kissing, sharing utensils, or sharing a razor can all spread the virus.   There are two types of herpes simplex virus: HSV-1 and HSV-2. Both types of HSV virus can cause sores around the mouth (cold sores) and on the genitals (genital herpes). For this reason, herpes can be spread through oral sex.   Cold sores tend to come back in the same place. The first time you get one, you may have a fever, swollen lymph nodes, a sore throat, or a headache. These symptoms usually don't happen again when cold sores return. Pain and blisters are usually less severe with repeat outbreaks. Some people can tell when their cold sores are about to come back because they feel tingling, pain, or burning in the same spot. This is known as the \"prodrome.\"   Common triggers of cold sores include:    Increased stress or a life change    Extended sun exposure    A recent viral infection, such as a cold    Hormonal changes, such as before or during your menstrual period    Dental work   Common signs and symptoms of cold sores include:    Pain or tenderness at the affected area.    Fluid-filled blisters.    Lesions above or below the lips or at the corners of the mouth. These are the most common areas affected by cold sores.    Having 1 to 5 sores. Cold sore outbreaks usually involve fewer than 5 sores.    A bumpy surface on the affected area.    A previous history of cold sores.   [image: " data:image/svg+xml;base64,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]   What to  expect   There is no cure for cold sores, and you'll likely have flares throughout your lifetime. However, you can manage your symptoms with this treatment plan. Even without treatment, cold sores aren't dangerous and most go away on their own within 7 to 14 days.   [image: data:image/svg+xml;base64,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]   When to seek care   Call us at [phone]   with any sudden or unexpected symptoms.    Sores lasting longer than 2 weeks.    Sores that often return.    Sores that become unbearably painful.    Extreme difficulty eating or drinking.    A fever higher than 103F or lasting longer than 4 days.   [image:  data:image/svg+xml;base64,XON3GcO0uCweoq7tbHL8qNhxC9t5nt62Hw8glhxyPiBxYX4lzcmsNTYmlhIyc297RmDjXBTzZ7xug9P7KgHdYZV7En4ykfOcq4PyZKTkSOifDCFkW38aHIA0lw7xrQpkzwRzjL9cDYRbmM9eUTIen3lbqGc1WrG7QqMzUEnanQL6AuW2RcT0xPJ0Av08MCMmINHpBdHeElUhQVwqaSgzkEQjFN2saYStd7YwxoXaFEYspCYjfGmaNFNuDZL3nbRrNp6QYWH7tI2irCcgarMzHC8dM6yvu3AvDDOfXHSjEZVqYI2zuatnGCTbKMT0vf1iPW60eQUiNKVxoT2bzvJzCLG8RpPxGVSqQTYcJYDxOF14QEFdKQXrQBMgCRW+FQ4om0w4X57mDzmMOMF2cU9pkSidahIdIJ3tN7pkx5JnEqIaNVSzW0ran1I9QpJbUKK6Wc2vftOyp0Pjl3svUQQlME9hAvFzj5mrlJZ1BkScQmKmYOQaRjMhGUE8XXL3PrT6UVB8Az15BGK8FAG+QK1hn9d0J52lDysLSOL0mYC2mMKnHA9cC1xjm2EnqZfivWjrUjZawLSwum9yr1Slr2PoOME0lv4jvCNtx1BklQttpVRtVII6Qv50VE90SHA2BemsDOAqIR3nDeTiKp08KSIjJj44FaAaTH50PT38SyDsEX67AmFxIi2cVO22QYZnLOReRPGjIPCxHX9mXkCyFw86AKTqN9szRK4jRUE3fQQbDdDNHLAbMFYrYO55pyZ+AB5zTBObFqn6L7T7Tx6=]   Other treatment   There are many home remedies for cold sores. However, the evidence is mixed on whether they help healing. Lysine, propolis (synthetic beeswax), and a cream combining rhubarb and christianne may help soothe the sore and shorten healing time. At the very least, these remedies won't cause harm.   Putting an ice cube on the cold sore may also help reduce pain and swelling.   [image:  data:image/svg+xml;base64,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]   Prevention   Since cold sores may be triggered by stress, think about changes you can make to manage stress. Many people find regular exercise, meditation, and yoga helpful for managing stress.   While you have cold sores, avoid kissing and sharing utensils, cups, lip balm, or razors. If you're sexually active, avoid oral sex while you have cold sores.   The virus can spread even when you don't have any sores. However, it spreads most easily when you have moist, active sores.   [image:  data:image/svg+xml;base64,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]   Your provider   Your diagnosis was provided by RONNIE Freeman, a member of your trusted care team at [facility].   If you have any questions, call us at [phone]  .

## 2023-09-13 RX ORDER — DESVENLAFAXINE 100 MG/1
TABLET, EXTENDED RELEASE ORAL
Qty: 90 TABLET | Refills: 0 | Status: SHIPPED | OUTPATIENT
Start: 2023-09-13

## 2023-11-06 ENCOUNTER — TELEPHONE (OUTPATIENT)
Dept: PEDIATRICS | Facility: OTHER | Age: 68
End: 2023-11-06
Payer: MEDICARE

## 2023-11-06 NOTE — TELEPHONE ENCOUNTER
"So if symptoms have been ongoign x 1 week, she is out of the window for any specific treatments or medications  Her first day of symptoms is considered \"day 0\", then she needed to be home and mostly isolated through day 5, after day 5, as long as she is not having fevers, she is ok to leave the home as long as she is wearing a mask until day 10, after that she is free to go back to normal  "

## 2023-11-06 NOTE — TELEPHONE ENCOUNTER
Caller: LORRAINE BEAR    Relationship to patient: Emergency Contact    Best call back number: [phone]     Date of positive COVID19 test: 11/05/23    Date if possible COVID19 exposure: N/A    COVID19 symptoms: NASAL CONGESTION, RUNNY NOSE    Date of initial quarantine: DID NOT SPECIFY A DATE    Additional information or concerns: PATIENTS SPOUSE STATES THE PATIENTS SYMPTOMS HAVE GONE ON FOR ABOUT A WEEK AND SHE IS STARTING TO FEEL BETTER, BUT IS NEEDING TO KNOW THE NEW COVID PROTOCOL.     What is the patients preferred pharmacy: [facility] [address] [place] [address] - [address] [facility] [phone] [facility] [phone]

## 2023-12-18 ENCOUNTER — HOSPITAL ENCOUNTER (OUTPATIENT)
Facility: HOSPITAL | Age: 68
Discharge: HOME | End: 2023-12-18
Admitting: INTERNAL MEDICINE
Payer: MEDICARE

## 2023-12-18 DIAGNOSIS — Z12.39 ENCOUNTER FOR BREAST CANCER SCREENING USING NON-MAMMOGRAM MODALITY: ICD-10-CM

## 2023-12-18 DIAGNOSIS — R92.30 DENSE BREAST TISSUE ON MAMMOGRAM: ICD-10-CM

## 2023-12-18 PROCEDURE — A9577 INJ MULTIHANCE: HCPCS | Performed by: INTERNAL MEDICINE

## 2023-12-18 PROCEDURE — C8908 MRI W/O FOL W/CONT, BREAST,: HCPCS

## 2023-12-18 PROCEDURE — 0 GADOBENATE DIMEGLUMINE 529 MG/ML SOLUTION: Performed by: INTERNAL MEDICINE

## 2023-12-18 PROCEDURE — C8937 CAD BREAST MRI: HCPCS

## 2023-12-18 RX ADMIN — GADOBENATE DIMEGLUMINE 15 ML: 529 INJECTION, SOLUTION INTRAVENOUS at 09:42
